# Patient Record
Sex: MALE | Race: BLACK OR AFRICAN AMERICAN | URBAN - METROPOLITAN AREA
[De-identification: names, ages, dates, MRNs, and addresses within clinical notes are randomized per-mention and may not be internally consistent; named-entity substitution may affect disease eponyms.]

---

## 2017-01-30 ENCOUNTER — TELEPHONE (OUTPATIENT)
Dept: FAMILY MEDICINE | Facility: CLINIC | Age: 37
End: 2017-01-30

## 2018-08-20 ENCOUNTER — OFFICE VISIT (OUTPATIENT)
Dept: FAMILY MEDICINE | Facility: CLINIC | Age: 38
End: 2018-08-20
Payer: COMMERCIAL

## 2018-08-20 VITALS
TEMPERATURE: 98.2 F | HEART RATE: 96 BPM | WEIGHT: 253 LBS | DIASTOLIC BLOOD PRESSURE: 84 MMHG | OXYGEN SATURATION: 97 % | BODY MASS INDEX: 30.8 KG/M2 | SYSTOLIC BLOOD PRESSURE: 124 MMHG

## 2018-08-20 DIAGNOSIS — Z71.6 TOBACCO ABUSE COUNSELING: ICD-10-CM

## 2018-08-20 DIAGNOSIS — E11.65 TYPE 2 DIABETES MELLITUS WITH HYPERGLYCEMIA, WITHOUT LONG-TERM CURRENT USE OF INSULIN (H): Primary | ICD-10-CM

## 2018-08-20 DIAGNOSIS — Z72.0 TOBACCO ABUSE: ICD-10-CM

## 2018-08-20 LAB
ANION GAP SERPL CALCULATED.3IONS-SCNC: 8 MMOL/L (ref 3–14)
BUN SERPL-MCNC: 9 MG/DL (ref 7–30)
CALCIUM SERPL-MCNC: 9.1 MG/DL (ref 8.5–10.1)
CHLORIDE SERPL-SCNC: 102 MMOL/L (ref 94–109)
CHOLEST SERPL-MCNC: 253 MG/DL
CO2 SERPL-SCNC: 26 MMOL/L (ref 20–32)
CREAT SERPL-MCNC: 0.59 MG/DL (ref 0.66–1.25)
GFR SERPL CREATININE-BSD FRML MDRD: >90 ML/MIN/1.7M2
GLUCOSE SERPL-MCNC: 261 MG/DL (ref 70–99)
HBA1C MFR BLD: 10.8 % (ref 0–5.6)
HDLC SERPL-MCNC: 44 MG/DL
LDLC SERPL CALC-MCNC: 172 MG/DL
NONHDLC SERPL-MCNC: 209 MG/DL
POTASSIUM SERPL-SCNC: 4.2 MMOL/L (ref 3.4–5.3)
SODIUM SERPL-SCNC: 136 MMOL/L (ref 133–144)
TRIGL SERPL-MCNC: 188 MG/DL
TSH SERPL DL<=0.005 MIU/L-ACNC: 1.22 MU/L (ref 0.4–4)

## 2018-08-20 ASSESSMENT — ENCOUNTER SYMPTOMS
FATIGUE: 0
COUGH: 0
CHILLS: 0
PALPITATIONS: 0
STRIDOR: 0
UNEXPECTED WEIGHT CHANGE: 0
WEAKNESS: 0
DIZZINESS: 0
NUMBNESS: 1
FEVER: 0
CHEST TIGHTNESS: 0
ACTIVITY CHANGE: 0
WHEEZING: 0
DIAPHORESIS: 0
SHORTNESS OF BREATH: 1
APPETITE CHANGE: 0

## 2018-08-20 NOTE — PATIENT INSTRUCTIONS
Here is the plan from today's visit    1. Type 2 diabetes mellitus with hyperglycemia, without long-term current use of insulin (H)  Will mychart with results   - metFORMIN (GLUCOPHAGE) 500 MG tablet; Take 2 tablets (1,000 mg) by mouth 2 times daily (with meals)  Dispense: 120 tablet; Refill: 3  - TSH with free T4 reflex  - Hemoglobin A1c  - Lipid panel reflex to direct LDL Fasting  - Basic metabolic panel    Diabetes: The Benefits of Exercise   Exercise can lower blood sugar, help control weight, and boost your mood. It can also improve blood flow, lower blood pressure, and improve heart health. Even a small amount of regular activity can have a big impact on your health      What Can Exercise Help?     Blood sugar. Regular exercise improves blood sugar control by helping your body use insulin.     Mental and emotional health. Physical activity relieves stress and helps you sleep better.     Heart health. With regular exercise, you can reduce your risk of heart disease and high blood pressure. You can also improve your cholesterol and triglyceride levels.     Weight. Exercise helps you lose fat, gain muscle, and control your weight.     Health of blood vessels and nerves. Activity helps lower blood sugar. This helps prevent damage to blood vessels and nerves that can cause problems with your brain, eyes, feet, and legs.     Finances. If you manage your blood sugar, you may spend less on medical care.  Two Types of Exercise   Two types of exercise help your body use blood sugar.     Aerobic exercise. This is a rhythmic, repeated, continued movement of large muscle groups for at least 10 minutes at a time. Examples include walking, bicycling, jogging, swimming, water aerobics, and many sports.     Resistance exercise (strength training). This type of exercise uses muscles to move weight or work against resistance. You can do it with free weights, machines, resistance tubing, or your own body weight.  When to Stop  Exercising and Call Your Doctor   Stop exercising and call your doctor right away if you notice any of the following:     Pain, pressure, tightness, or heaviness in the chest     Pain or heaviness in the neck, shoulders, back, arms, legs, or feet     Unusual shortness of breath     Dizziness or lightheadedness     Unusually rapid or slow pulse     Increased joint or muscle pain           Please call or return to clinic if your symptoms don't go away.    Your A1c values are:  Lab Results   Component Value Date    A1C 9.3 12/28/2016    A1C 9.8 12/16/2016       This chart estimates the average blood sugar based on the A1c level.  A1c (%) BG (mg/dL)   6 126   7 154   8 183   9 212   10 240   11 269   12 298   13 326     ADA Conversion tool direct link: https://[email or web address].diabetes.org/diapro/glucose_calc        Follow up plan  Please make a clinic appointment for follow up with your primary physician Nito Dutta MD in 3 months.   Please make an appointment and follow up with a Pharmacist for a Medication Therapy Management as soon as you can for Uncontrolled DM   Please bring your glucometer and your medications to your next visit.  Thank you for coming to [facility] today.  Lab Testing:  **If you had lab testing today and your results are reassuring or normal they will be mailed to you or sent through [employer] within 7 days.   **If the lab tests need quick action we will call you with the results.  The phone number we will call with results is # [phone] (home) . If this is not the best number please call our clinic and change the number.  Medication Refills:  If you need any refills please call your pharmacy and they will contact us.   If you need to  your refill at a new pharmacy, please contact the new pharmacy directly. The new pharmacy will help you get your medications transferred faster.   Scheduling:  If you have any concerns about today's visit or wish to schedule another appointment please  call our office during normal business hours [phone] (8-5:00 M-F)  If a referral was made to a [facility] and you don't get a call from central scheduling please call [phone].  If a Mammogram was ordered for you at [facility] call [phone] to schedule or change your appointment.  If you had an XRay/CT/Ultrasound/MRI ordered the number is [phone] to schedule or change your radiology appointment.   Medical Concerns:  If you have urgent medical concerns please call [phone] at any time of the day.    Rikki Franklin, DO

## 2018-08-20 NOTE — MR AVS SNAPSHOT
After Visit Summary   8/20/2018    Erika Padilla    MRN: [id number]           Patient Information     Date Of Birth          1980        Visit Information        Provider Department      8/20/2018 1:20 PM Rikki Franklin DO [facility]        Today's Diagnoses     Type 2 diabetes mellitus with hyperglycemia, without long-term current use of insulin (H)    -  1      Care Instructions    Here is the plan from today's visit    1. Type 2 diabetes mellitus with hyperglycemia, without long-term current use of insulin (H)  Will mychart with results   - metFORMIN (GLUCOPHAGE) 500 MG tablet; Take 2 tablets (1,000 mg) by mouth 2 times daily (with meals)  Dispense: 120 tablet; Refill: 3  - TSH with free T4 reflex  - Hemoglobin A1c  - Lipid panel reflex to direct LDL Fasting  - Basic metabolic panel    Diabetes: The Benefits of Exercise   Exercise can lower blood sugar, help control weight, and boost your mood. It can also improve blood flow, lower blood pressure, and improve heart health. Even a small amount of regular activity can have a big impact on your health      What Can Exercise Help?     Blood sugar. Regular exercise improves blood sugar control by helping your body use insulin.     Mental and emotional health. Physical activity relieves stress and helps you sleep better.     Heart health. With regular exercise, you can reduce your risk of heart disease and high blood pressure. You can also improve your cholesterol and triglyceride levels.     Weight. Exercise helps you lose fat, gain muscle, and control your weight.     Health of blood vessels and nerves. Activity helps lower blood sugar. This helps prevent damage to blood vessels and nerves that can cause problems with your brain, eyes, feet, and legs.     Finances. If you manage your blood sugar, you may spend less on medical care.  Two Types of Exercise   Two types of exercise help your body use blood sugar.     Aerobic  exercise. This is a rhythmic, repeated, continued movement of large muscle groups for at least 10 minutes at a time. Examples include walking, bicycling, jogging, swimming, water aerobics, and many sports.     Resistance exercise (strength training). This type of exercise uses muscles to move weight or work against resistance. You can do it with free weights, machines, resistance tubing, or your own body weight.  When to Stop Exercising and Call Your Doctor   Stop exercising and call your doctor right away if you notice any of the following:     Pain, pressure, tightness, or heaviness in the chest     Pain or heaviness in the neck, shoulders, back, arms, legs, or feet     Unusual shortness of breath     Dizziness or lightheadedness     Unusually rapid or slow pulse     Increased joint or muscle pain           Please call or return to clinic if your symptoms don't go away.    Your A1c values are:  Lab Results   Component Value Date    A1C 9.3 12/28/2016    A1C 9.8 12/16/2016       This chart estimates the average blood sugar based on the A1c level.  A1c (%) BG (mg/dL)   6 126   7 154   8 183   9 212   10 240   11 269   12 298   13 326     ADA Conversion tool direct link: https://[email or web address].diabetes.org/diapro/glucose_calc        Follow up plan  Please make a clinic appointment for follow up with your primary physician Nito Dutta MD in 3 months.   Please make an appointment and follow up with a Pharmacist for a Medication Therapy Management as soon as you can for Uncontrolled DM   Please bring your glucometer and your medications to your next visit.  Thank you for coming to [facility] today.  Lab Testing:  **If you had lab testing today and your results are reassuring or normal they will be mailed to you or sent through [employer] within 7 days.   **If the lab tests need quick action we will call you with the results.  The phone number we will call with results is # [phone] (home) . If this is not the best  number please call our clinic and change the number.  Medication Refills:  If you need any refills please call your pharmacy and they will contact us.   If you need to  your refill at a new pharmacy, please contact the new pharmacy directly. The new pharmacy will help you get your medications transferred faster.   Scheduling:  If you have any concerns about today's visit or wish to schedule another appointment please call our office during normal business hours [phone] (8-5:00 M-F)  If a referral was made to a [facility] and you don't get a call from central scheduling please call [phone].  If a Mammogram was ordered for you at [facility] call [phone] to schedule or change your appointment.  If you had an XRay/CT/Ultrasound/MRI ordered the number is [phone] to schedule or change your radiology appointment.   Medical Concerns:  If you have urgent medical concerns please call [phone] at any time of the day.    Rikki Franklin, DO            Follow-ups after your visit        Additional Services     Clinical Pharmacy-\[facility]\"" INTERNAL REFERRAL       Reason: Uncontrolled DM  Urgency of Appointment: Next Available  Is this for a one time consult or how many independent pharmacy visits should the patient have before having a follow up provider appointment? Number of Independent Pharmacy Visits=15                  Who to contact     Please call your clinic at [phone] to:    Ask questions about your health    Make or cancel appointments    Discuss your medicines    Learn about your test results    Speak to your doctor            Additional Information About Your Visit        Care EveryWhere ID     This is your Care EveryWhere ID. This could be used by other organizations to access your [facility] medical records  [id number]        Your Vitals Were     Pulse Temperature Pulse Oximetry BMI (Body Mass Index)          96 98.2  F (36.8  C) (Oral) 97% 30.8 kg/m2          Blood Pressure from Last 3 Encounters:   08/20/18 124/84   12/28/16 107/72   12/16/16 129/89    Weight from Last 3 Encounters:   08/20/18 253 lb (114.8 kg)   12/28/16 249 lb (112.9 kg)   12/16/16 250 lb 3.2 oz (113.5 kg)              We Performed the Following     Basic metabolic panel     Clinical Pharmacy-[facility] INTERNAL REFERRAL     Hemoglobin A1c     Lipid panel reflex to direct LDL Fasting     TSH with free T4 reflex          Where to get your medicines      These medications were sent to [facility] [address] [place], [place] [address] AT [facility] & [address] [place] [id number]    Hours:  24-hours Phone:  [phone]     metFORMIN 500 MG tablet          Primary Care Provider Office Phone # Fax #    Nito Dutta [phone] [address]  [facility] [id number]        Equal Access to Services     CHRISTOPHER GAVIRIA AH: Hadii aad ku hadasho Soomaali, waaxda luqadaha, qaybta kaalmada adeegyada, waxay idiin hayaan chuy escalera . So [facility] [phone].    ATENCIÓN: Si habla español, tiene a magana disposición servicios gratuitos de asistencia lingüística. [facility] [phone].    We comply with applicable federal civil rights laws and [place] laws. We do not discriminate on the basis of race, color, national origin, age, disability, sex, sexual orientation, or gender identity.            Thank you!     Thank you for choosing [facility]  for your care. Our goal is always to provide you with excellent care. Hearing back from our patients is one way we can continue to improve our services. Please take a few minutes to complete the written survey that you may receive in the mail after your visit with us. Thank you!             Your Updated Medication List - Protect others around you: Learn how to safely use, store and throw away your medicines at www.disposemymeds.org.          This list is accurate as of  8/20/18  2:16 PM.  Always use your most recent med list.                   Brand Name Dispense Instructions for use Diagnosis    blood glucose monitoring lancets     1 Box    Use to test blood sugar 1-3 times daily or as directed.    Type 2 diabetes mellitus with hyperglycemia, without long-term current use of insulin (H)       blood glucose monitoring test strip    COURTNEY CONTOUR NEXT    100 strip    Use to test blood sugar 1-3 times daily or as directed.    Type 2 diabetes mellitus with hyperglycemia, without long-term current use of insulin (H)       metFORMIN 500 MG tablet    GLUCOPHAGE    120 tablet    Take 2 tablets (1,000 mg) by mouth 2 times daily (with meals)    Type 2 diabetes mellitus with hyperglycemia, without long-term current use of insulin (H)

## 2018-08-20 NOTE — PROGRESS NOTES
"       HPI       Erika Padilla is a 38 year old male with Type 2 diabetes who presents for   Chief Complaint   Patient presents with     Breathing Problem     Pt complains of SOB x 1 wk.      Refill Request     Metformin     1. Breathing Problem. Started about a week ago. Occurs once a day. Patient feels like he can't breath and a \"pinch\" that he localizes along the caudal end of his sternum. He takes a few heavy, deep breaths to \"let it out\" and then the pain goes away. When this started a week ago it would happen after he smoked hooka, which he smoked every day after work. Because of this breathing problem, he stopped smoking hooka for the past three days but the breathing problem kept happening once a day in the afternoon. Does not occur at night time. It is not associated with movement, eating, or working out. No chest tightness, wheezing, coughing, palpitations, swelling of feet, fevers, night sweats,     2. Type 2 Diabetes    Diabetes Follow-up      Patient is checking blood sugars: not at all         -Last A1C was   Lab Results   Component Value Date    A1C 9.3 12/28/2016    A1C 9.8 12/16/2016        Diabetic concerns: None    Chest Pain or exercise related calf pain (claudication):no     Symptoms of hypoglycemia (low blood sugar): dizzy, couple of weeks ago, slurring words, ate a piece of candy and it went away, never happened before     Paresthesias (numbness or burning in feet) or sores: Yes. Burning localized to bottom of left foot that started 3 days ago     Diabetic eye exam within the last year?: No      Adherence and Exercise  Medication side effects: no  How often is a medication missed? About 1-3 times per week. Takes two 500mg metformin tablets with lunch and two tablets at night time.  Exercise: No exercise. Job is enough exercise works for [employer].    Problem, Medication and Allergy Lists were reviewed and updated if needed..    Patient is an established patient of this clinic. Last " seen at [facility] 12/28/2016.         Review of Systems:   Review of Systems   Constitutional: Negative for activity change, appetite change, chills, diaphoresis, fatigue, fever and unexpected weight change.   Eyes: Negative for visual disturbance.   Respiratory: Positive for shortness of breath. Negative for cough, chest tightness, wheezing and stridor.    Cardiovascular: Positive for chest pain. Negative for palpitations and leg swelling.   Gastrointestinal: Negative for nausea and vomiting.   Endocrine: Negative for polydipsia and polyuria.   Musculoskeletal: Negative for myalgias.   Skin: Negative for rash.   Neurological: Positive for numbness. Negative for dizziness and weakness.   Hematological: Negative for adenopathy.   Psychiatric/Behavioral: Negative for decreased concentration. The patient is not nervous/anxious.      Numbness of second left toe accompanied by burning sensation of bottom of left foot that started three days ago.         Physical Exam:     Vitals:    08/20/18 1329   BP: 124/84   Pulse: 96   Temp: 98.2  F (36.8  C)   TempSrc: Oral   SpO2: 97%   Weight: 253 lb (114.8 kg)     Body mass index is 30.8 kg/(m^2).  Vitals were reviewed and were normal     Physical Exam     Constitutional: He is oriented to person, place, and time. He appears well-developed and well-nourished. No distress.   Eyes: Conjunctivae and EOM are normal. Pupils are equal, round, and reactive to light. Right eye exhibits no discharge. Left eye exhibits no discharge. No scleral icterus.   Cardiovascular: Normal rate, regular rhythm, normal heart sounds and intact distal pulses.  Exam reveals no gallop and no friction rub.  No murmur heard.  Pulmonary/Chest: Effort normal and breath sounds normal. No respiratory distress. He has no wheezes. He has no rales. He exhibits no tenderness to palpation over sternum.  Abdominal: Soft. He exhibits no distension. There is no tenderness. There is no rebound and no guarding.    Musculoskeletal: He exhibits no lower extremity edema. No sores on either feet with intact sensation and palpable pedal pulses.   Neurological: He is alert and oriented to person, place, and time.   Skin: He is not diaphoretic.   Psychiatric: He has a normal mood and affect. His behavior is normal.         Results:      Results from this visit  Results for orders placed or performed in visit on 08/20/18   TSH with free T4 reflex   Result Value Ref Range    TSH 1.22 0.40 - 4.00 mU/L   Hemoglobin A1c   Result Value Ref Range    Hemoglobin A1C 10.8 (H) 0 - 5.6 %   Lipid panel reflex to direct LDL Fasting   Result Value Ref Range    Cholesterol 253 (H) <200 mg/dL    Triglycerides 188 (H) <150 mg/dL    HDL Cholesterol 44 >39 mg/dL    LDL Cholesterol Calculated 172 (H) <100 mg/dL    Non HDL Cholesterol 209 (H) <130 mg/dL   Basic metabolic panel   Result Value Ref Range    Sodium 136 133 - 144 mmol/L    Potassium 4.2 3.4 - 5.3 mmol/L    Chloride 102 94 - 109 mmol/L    Carbon Dioxide 26 20 - 32 mmol/L    Anion Gap 8 3 - 14 mmol/L    Glucose 261 (H) 70 - 99 mg/dL    Urea Nitrogen 9 7 - 30 mg/dL    Creatinine 0.59 (L) 0.66 - 1.25 mg/dL    GFR Estimate >90 >60 mL/min/1.7m2    GFR Estimate If Black >90 >60 mL/min/1.7m2    Calcium 9.1 8.5 - 10.1 mg/dL       Assessment and Plan      Erika Padilla is a 38 year old male with Type 2 diabetes who presents for 1 week history of daily episode of difficulty breathing with some chest pain that is associated with smoking hooka and resolves with a few deep breaths. Patient stopped smoking hooka 3 days ago but daily breathing episode persisted.     Type 2 diabetes mellitus with hyperglycemia, without long-term current use of insulin (H)  Patient was last seen 12/2016 for diabetes care at which time his [id number] was 9.3. He has been taking his metformin but missing doses 2-3 times per week.   - Refilled metformin prescription for 3 months  - Ordered routine labs for Type 2  diabetes (Hemoglobin A1c, Lipid panel, BMP and TSH with free T4).   - Will send lab results to patient when labs result .   - Will initiate statin if LDL is elevated   - Pneumovax  - Follow-up with PCP in a few weeks. Follow up with pharmacist for Medication Therapy Management as soon as possible.  -     metFORMIN (GLUCOPHAGE) 500 MG tablet; Take 2 tablets (1,000 mg) by mouth 2 times daily (with meals)  -     TSH with free T4 reflex  -     Hemoglobin A1c  -     Lipid panel reflex to direct LDL Fasting  -     Basic metabolic panel  -     Clinical Pharmacy-hospitals INTERNAL REFERRAL  -     ADMIN VACCINE, INITIAL  -     Pneumococcal vaccine 23 valent PPSV23  (Pneumovax) [79950]    Tobacco abuse  Tobacco abuse counseling  -     SMOKING CESSATION COUNSELING 3-10 MIN (Tobacco Nicotine) [67970]  Breathing Problem. Presentation of symptoms along with vitals and physical examination are not concerning for acute cardiovascular or respiratory disease. Discussed continuing hooka smoking cessation with patient and monitoring of symptoms with follow-up if symptoms get worse. Also potential for anxiety related component.      Medications Discontinued During This Encounter   Medication Reason     metFORMIN (GLUCOPHAGE) 500 MG tablet Reorder     Multiple Vitamins-Minerals (QC MENS DAILY MULTIVITAMIN) TABS Stopped by Patient     Cholecalciferol (VITAMIN D) 2000 UNITS tablet Stopped by Patient       Options for treatment and follow-up care were reviewed with the patient. Erika Padilla  engaged in the decision making process and verbalized understanding of the options discussed and agreed with the final plan.    Abhi Ford, MS3    Resident/Fellow Attestation   I, Rikki Franklin, was present with the medical student who participated in the service and in the documentation of the note.  I have verified the history and personally performed the physical exam and medical decision making.  I agree with the assessment and plan of  care as documented in the note.      Rikki Franklin DO, MA  Family Medicine PGY-2  [facility], \[facility] [place] [facility]\"" Family Medicine   Pager: [phone]

## 2018-08-20 NOTE — PROGRESS NOTES
Preceptor Attestation:   Patient seen, evaluated and discussed with the resident. I have verified the content of the note, which accurately reflects my assessment of the patient and the plan of care.   Supervising Physician:  Shivani Hunter MD

## 2018-08-21 ENCOUNTER — TELEPHONE (OUTPATIENT)
Dept: FAMILY MEDICINE | Facility: CLINIC | Age: 38
End: 2018-08-21

## 2018-08-21 DIAGNOSIS — E78.5 HYPERLIPIDEMIA LDL GOAL <100: ICD-10-CM

## 2018-08-21 DIAGNOSIS — E11.65 TYPE 2 DIABETES MELLITUS WITH HYPERGLYCEMIA, WITHOUT LONG-TERM CURRENT USE OF INSULIN (H): Primary | ICD-10-CM

## 2018-08-21 RX ORDER — ATORVASTATIN CALCIUM 40 MG/1
40 TABLET, FILM COATED ORAL DAILY
Qty: 30 TABLET | Refills: 1 | Status: SHIPPED | OUTPATIENT
Start: 2018-08-21 | End: 2019-08-19

## 2018-08-21 ASSESSMENT — ENCOUNTER SYMPTOMS
POLYDIPSIA: 0
NERVOUS/ANXIOUS: 0
VOMITING: 0
DECREASED CONCENTRATION: 0
MYALGIAS: 0
NAUSEA: 0
ADENOPATHY: 0

## 2019-02-11 DIAGNOSIS — E11.65 TYPE 2 DIABETES MELLITUS WITH HYPERGLYCEMIA, WITHOUT LONG-TERM CURRENT USE OF INSULIN (H): ICD-10-CM

## 2019-02-12 NOTE — TELEPHONE ENCOUNTER

## 2019-08-01 ENCOUNTER — TELEPHONE (OUTPATIENT)
Dept: FAMILY MEDICINE | Facility: CLINIC | Age: 39
End: 2019-08-01

## 2019-08-19 ENCOUNTER — OFFICE VISIT (OUTPATIENT)
Dept: FAMILY MEDICINE | Facility: CLINIC | Age: 39
End: 2019-08-19
Payer: COMMERCIAL

## 2019-08-19 VITALS
WEIGHT: 246.8 LBS | OXYGEN SATURATION: 99 % | RESPIRATION RATE: 14 BRPM | SYSTOLIC BLOOD PRESSURE: 121 MMHG | DIASTOLIC BLOOD PRESSURE: 88 MMHG | HEIGHT: 74 IN | HEART RATE: 71 BPM | TEMPERATURE: 98.2 F | BODY MASS INDEX: 31.67 KG/M2

## 2019-08-19 DIAGNOSIS — E78.5 HYPERLIPIDEMIA LDL GOAL <100: ICD-10-CM

## 2019-08-19 DIAGNOSIS — E11.65 TYPE 2 DIABETES MELLITUS WITH HYPERGLYCEMIA, WITHOUT LONG-TERM CURRENT USE OF INSULIN (H): Primary | ICD-10-CM

## 2019-08-19 DIAGNOSIS — Z00.01 ENCOUNTER FOR ROUTINE ADULT MEDICAL EXAM WITH ABNORMAL FINDINGS: ICD-10-CM

## 2019-08-19 LAB
BUN SERPL-MCNC: 5.6 MG/DL (ref 7–21)
CALCIUM SERPL-MCNC: 9.4 MG/DL (ref 8.5–10.1)
CHLORIDE SERPLBLD-SCNC: 98.7 MMOL/L (ref 98–110)
CHOLEST SERPL-MCNC: 235.4 MG/DL (ref 0–200)
CHOLEST/HDLC SERPL: 5 {RATIO} (ref 0–5)
CO2 SERPL-SCNC: 25.1 MMOL/L (ref 20–32)
CREAT SERPL-MCNC: 0.6 MG/DL (ref 0.7–1.3)
GFR SERPL CREATININE-BSD FRML MDRD: >90 ML/MIN/1.7 M2
GLUCOSE SERPL-MCNC: 344.2 MG'DL (ref 70–99)
HBA1C MFR BLD: 11.6 % (ref 4.1–5.7)
HDLC SERPL-MCNC: 47.4 MG/DL
LDLC SERPL CALC-MCNC: 149 MG/DL (ref 0–129)
POTASSIUM SERPL-SCNC: 3.8 MMOL/DL (ref 3.3–4.5)
SODIUM SERPL-SCNC: 134.1 MMOL/L (ref 132.6–141.4)
TRIGL SERPL-MCNC: 195.1 MG/DL (ref 0–150)
VLDL CHOLESTEROL: 39 MG/DL (ref 7–32)

## 2019-08-19 RX ORDER — BISMUTH SUBSALICYLATE 262MG/15ML
SUSPENSION, ORAL (FINAL DOSE FORM) ORAL
Qty: 100 EACH | Refills: 0 | Status: SHIPPED | OUTPATIENT
Start: 2019-08-19 | End: 2019-08-28

## 2019-08-19 RX ORDER — ATORVASTATIN CALCIUM 40 MG/1
40 TABLET, FILM COATED ORAL DAILY
Qty: 30 TABLET | Refills: 1 | Status: SHIPPED | OUTPATIENT
Start: 2019-08-19 | End: 2019-08-28

## 2019-08-19 RX ORDER — BLOOD-GLUCOSE METER
EACH MISCELLANEOUS
Qty: 1 KIT | Refills: 0 | Status: SHIPPED | OUTPATIENT
Start: 2019-08-19 | End: 2022-02-18

## 2019-08-19 ASSESSMENT — MIFFLIN-ST. JEOR: SCORE: 2104.23

## 2019-08-19 NOTE — PROGRESS NOTES
HPI       Erika Padilla is a 39 year old  who presents for   Chief Complaint   Patient presents with     Physical     Diabetic education information         Diabetes Follow-up  <7.0    Patient is checking blood sugars: not at all         -Last A1C was   Lab Results   Component Value Date    A1C 11.6 08/19/2019    A1C 10.8 08/20/2018    A1C 9.3 12/28/2016    A1C 9.8 12/16/2016        Diabetic concerns: None    Chest Pain or exercise related calf pain (claudication):no     Symptoms of hypoglycemia (low blood sugar): none     Paresthesias (numbness or burning in feet) or sores: No     Diabetic eye exam within the last year?: No    Hyperlipidemia Follow-Up      Recommended Level of Therapy:High Intensity Statin (atorvastatin 40*-80 mg or rosuvastatin 20-40mg)    Rate your low fat/cholesterol diet?: good    Taking statin?  Stopped due to unsure    Other lipid medications/supplements?:  none  Lab Results   Component Value Date    CHOL 253 08/20/2018     Lab Results   Component Value Date    HDL 44 08/20/2018     Lab Results   Component Value Date     08/20/2018     Lab Results   Component Value Date    TRIG 188 08/20/2018     Lab Results   Component Value Date    CHOLHDLRATIO 5.7 12/16/2016       Last Basic Metabolic Panel:  Lab Results   Component Value Date     08/20/2018      Lab Results   Component Value Date    POTASSIUM 4.2 08/20/2018     Lab Results   Component Value Date    CHLORIDE 102 08/20/2018     Lab Results   Component Value Date    KRISH 9.1 08/20/2018     Lab Results   Component Value Date    CO2 26 08/20/2018     Lab Results   Component Value Date    BUN 9 08/20/2018     Lab Results   Component Value Date    CR 0.59 08/20/2018     Lab Results   Component Value Date     08/20/2018         Adherence and Exercise  Medication side effects: yes: some loose stool with metformin  How often is a medication missed? More than 3 times per week  Exercise:No exercise None       "  +++++++      Problem, Medication and Allergy Lists were reviewed and updated if needed..    Patient is an established patient of this clinic..         Review of Systems:   Review of Systems  ROS: 7 point ROS neg other than the symptoms noted above in the HPI.       Physical Exam:     Vitals:    08/19/19 1325   BP: 121/88   Pulse: 71   Resp: 14   Temp: 98.2  F (36.8  C)   TempSrc: Oral   SpO2: 99%   Weight: 111.9 kg (246 lb 12.8 oz)   Height: 1.88 m (6' 2\")     Body mass index is 31.69 kg/m .  Vitals were reviewed and were normal     Physical Exam  General- No acute distress, well-appearing  Skin- warm, no obvious skin lesions  Neuro- AOX3, CN 2-12 grossly intact  Cardio- RRR, no murmurs  Pulmonary- CTA in all fields, no wheezes or rhales  Psych- appropriate mood and affect    Results:      Results from this visit  Results for orders placed or performed in visit on 08/19/19   Hemoglobin A1c ([facility])   Result Value Ref Range    Hemoglobin A1C 11.6 (H) 4.1 - 5.7 %       Assessment and Plan        Erika was seen today for physical.    Diagnoses and all orders for this visit:    Patient here for follow-up.  Patient initially scheduled for physical to due to time constraints only had time to discuss diabetes management.  Patient has not been checking his blood sugars or taking his metformin.  Patient never was able to fill his atorvastatin.  Patient has not been working on dietary changes and drinks a large amount of iced tea every day.  Discussed with patient various treatment options including starting insulin which I recommend given his hemoglobin A1c above 10.  Patient was agreeable to starting insulin and we had him meet with 1 of our RNs in clinic today to discuss how to do that.  Patient will check his blood sugar twice a day and recommend he follow-up in 2 weeks to further titrate up his insulin.  Patient would also like to restart metformin to see if continued use will result in improvement in his " diarrhea.  Patient did report that he is moving to [place] and I recommend that he get established with a provider there as soon as possible as his insulin is given need careful titration.  Patient would also benefit from a diabetic foot exam and eye exam which we did not have time to discuss today.        Type 2 diabetes mellitus with hyperglycemia, without long-term current use of insulin (H)  -     Hemoglobin A1c ([facility])  -     Basic Metabolic Panel ([facility])  -     Lipid Cascade ([facility])  -     Cancel: Urinalysis, Micro If (UA) ([facility])  -     blood glucose (COURTNEY CONTOUR NEXT) test strip; Use to test blood sugar 1-3 times daily or as directed.  -     metFORMIN (GLUCOPHAGE) 500 MG tablet; Take 2 tablets (1,000 mg) by mouth 2 times daily (with meals) - NEEDS APPOINTMENT  -     blood glucose monitoring (ULTRA THIN 30G) lancets; Use to test blood sugar 1-3 times daily or as directed.  -     insulin glargine (LANTUS PEN) 100 UNIT/ML pen; Inject 10 Units Subcutaneous At Bedtime  -     blood glucose monitoring (ACCU-CHEK DARRIN PLUS) meter device kit; Use to test blood sugar 2 times daily or as directed.    Encounter for routine adult medical exam with abnormal findings  -     insulin glargine (LANTUS PEN) 100 UNIT/ML pen; Inject 10 Units Subcutaneous At Bedtime  -     blood glucose monitoring (ACCU-CHEK DARRIN PLUS) meter device kit; Use to test blood sugar 2 times daily or as directed.    Hyperlipidemia LDL goal <100  -     insulin glargine (LANTUS PEN) 100 UNIT/ML pen; Inject 10 Units Subcutaneous At Bedtime  -     blood glucose monitoring (ACCU-CHEK DARRIN PLUS) meter device kit; Use to test blood sugar 2 times daily or as directed.  -     atorvastatin (LIPITOR) 40 MG tablet; Take 1 tablet (40 mg) by mouth daily           Medications Discontinued During This Encounter   Medication Reason     blood glucose monitoring (COURTNEY CONTOUR NEXT) test strip Reorder     metFORMIN (GLUCOPHAGE) 500 MG tablet Reorder      blood glucose monitoring (ULTRA THIN 30G) lancets Reorder     atorvastatin (LIPITOR) 40 MG tablet Reorder       Options for treatment and follow-up care were reviewed with the patient. Erika Padilla  engaged in the decision making process and verbalized understanding of the options discussed and agreed with the final plan.    Gato Bear, DO

## 2019-08-19 NOTE — NURSING NOTE
AVS and Discharge instructions reviewed with the patient, including how to use blood glucose monitoring machine, proper insulin dosage and self-administering. Pt was able to demonstrate the instruction in clinic, encouraged,  to call back the clinic for any question or clarification, Pt verbalized understanding and agreed with the plan.    Francisco Levine RN

## 2019-08-19 NOTE — PATIENT INSTRUCTIONS
Preventive Health Recommendations  Male Ages 26 - 39    Yearly exam:             See your health care provider every year in order to  o   Review health changes.   o   Discuss preventive care.    o   Review your medicines if your doctor has prescribed any.    You should be tested each year for STDs (sexually transmitted diseases), if you re at risk.     After age 35, talk to your provider about cholesterol testing. If you are at risk for heart disease, have your cholesterol tested at least every 5 years.     If you are at risk for diabetes, you should have a diabetes test (fasting glucose).  Shots: Get a flu shot each year. Get a tetanus shot every 10 years.     Nutrition:    Eat at least 5 servings of fruits and vegetables daily.     Eat whole-grain bread, whole-wheat pasta and brown rice instead of white grains and rice.     Get adequate Calcium and Vitamin D.     Lifestyle    Exercise for at least 150 minutes a week (30 minutes a day, 5 days a week). This will help you control your weight and prevent disease.     Limit alcohol to one drink per day.     No smoking.     Wear sunscreen to prevent skin cancer.     See your dentist every six months for an exam and cleaning.   Diabetes Information    MY SELF MANAGEMENT GOAL(S):    Fasting ( morning blood sugar) should be less than 130    After meals should be less than 200      GENERAL TARGETS FOR YOUR DIABETES CARE:    A1c   Mine is:   Lab Results   Component Value Date    A1C 11.6 08/19/2019    Goal: is at least under 7.0 (higher for some people)  Check it: Every 3 to 6 months  Why:  Shows how well your blood glucose was controlled over the past 3 months    Blood pressure   Mine was 121/88 today  Goal: Under 140/90  Check it:  At every clinic check up for diabetes  Why:  May prevent stroke, heart disease and eye and kidney diseases    Cholesterol   Mine is:   Lab Results   Component Value Date     08/20/2018    Goal:  Goal LDL (bad cholesterol) is at least  under 100 (lower for many people), all people with diabetes should be taking a statin medicine  Check it:  Every year  Why:  Helps prevent heart attacks and stroke    Aspirin  Goal:  If you are age 50 or older, ask your doctor if you should take aspirin  Take it: Every day, or as prescribed  Why: Lowers the risk of heart attack and stroke    Smoking and tobacco  Goal: No tobacco use  Why: Tobacco is a major risk for heart disease    Kidney test (urine microalbumin)  Goal:  Under 30  Do it:  Every year  Why:  Finds kidney problems before they get worse  Foot exam   Goal:  No cuts or sores, normal sensation  Do it: Remove shoes and socks at every visit; have a monofilament test every year  Why: Finds foot problems before they get worse  Eye exam   Goal:  No changes in your eyes  Do it: Every year  Why:  Finds eye problems before they get worse  Exercise  Goal:  150 minutes of aerobic exercises and 2 to 3 sessions of strength training  Do it: Every week  Why:  Helps manage diabetes and improve health  Diabetes education  Goal: Learn how to manage your diabetes  Do it: At least once a year--ask your doctor for a referral  Why: The more you know, the better you can manage your diabetes  Your goals may be different from what you see here. Your care team will tell you what your goals should be.   For informational purposes only. Not to replace the advice of your health care provider.     GENERAL ONLINE LINKS:    https://www.acponline.org/system/files/documents/patients_families/products/brochures/protected/living-with-diabetes-english.pdf    A comprehensive PDF that covers a variety of topics including exercise, diet, checking blood sugar, foot checks and information about medicines and insulin.     https://ethnomed.org/patient-education/diabetes    A great source site with information on a variety of different topics in many different languages including [place]an and  [place].     https://www.reg.org/ape/courselisting/onlinecourselisting.asp     EATING HEALTHY WITH DIABETES:  Plate Method

## 2019-08-21 ENCOUNTER — CARE COORDINATION (OUTPATIENT)
Dept: FAMILY MEDICINE | Facility: CLINIC | Age: 39
End: 2019-08-21

## 2019-08-21 NOTE — PROGRESS NOTES
Erika Padilla is a 39 year old male who has diabetes, and his sister Stefan called the  staff regarding his condition. Stefan was asking about the different programs that were provided by the clinic that can help with the pt's condition. CHW called Stefan back to discuss the CDSMP classes provided to [place] patients with chronic conditions, the pt's sister was unable to be reached. CHW left a detailed VM requesting a call back to [facility].     Erica Laytonthais, August 21, 2019 at 2:15 PM

## 2019-08-23 ENCOUNTER — CARE COORDINATION (OUTPATIENT)
Dept: FAMILY MEDICINE | Facility: CLINIC | Age: 39
End: 2019-08-23

## 2019-08-23 ENCOUNTER — TELEPHONE (OUTPATIENT)
Dept: FAMILY MEDICINE | Facility: CLINIC | Age: 39
End: 2019-08-23

## 2019-08-23 ENCOUNTER — PATIENT OUTREACH (OUTPATIENT)
Dept: FAMILY MEDICINE | Facility: CLINIC | Age: 39
End: 2019-08-23

## 2019-08-23 NOTE — TELEPHONE ENCOUNTER
Patients sister called to return Erica Padilla's phone call from 08/21/2019 and would like a return phone call regarding the voice message.     Assisted and making an appointment for Patient on 08/28/2019 with Dr Bear.    Kaley Fuentes [facility]

## 2019-08-23 NOTE — PROGRESS NOTES
Erika Padilla is a 39 year old male who received a call back from the CHW as requested. CHW spoke with the pt's sister Stefan regarding her concern for the pt's condition, and her thought that the pt isn't able to manage his diabetes. She stated that he is a  and would like if the clinic can provide him with some patient education. CHW discussed the CDSMP class with the pt, but was told that he needed something sooner while his sister encourages the pt to stay back from work for a while. She think he doesn't take his condition seriously. CHW discussed the possibility of a home visit for the pt and providing him with some health promotion materials and patient education on how to better manage his condition. CHW asked if the pt would be open to a home visit and she said he would be. Pt's sister was told to give CHW time to coordinate that visit with other clinic staff, and to expect a call back.    Erica Padilla, August 23, 2019 at 9:45 AM

## 2019-08-26 ENCOUNTER — TELEPHONE (OUTPATIENT)
Dept: FAMILY MEDICINE | Facility: CLINIC | Age: 39
End: 2019-08-26

## 2019-08-26 ENCOUNTER — CARE COORDINATION (OUTPATIENT)
Dept: FAMILY MEDICINE | Facility: CLINIC | Age: 39
End: 2019-08-26

## 2019-08-26 NOTE — PROGRESS NOTES
Erika Padilla is a 39 year old male who's sister Stefan reached out to the CHW to follow-up with the CHW regarding the possibility of a home visit for the pt. The pt's sister was told the pt's PCP wouldn't be able to attend a home visit for the pt soon, and we would look further into having another provider attend a possible home visit. The pt's sister was concerned about the pt not being able to get some pt education before he goes back to his job as a , and was told by the CHW that the clinic would provide some pt education for the pt during his visit. CHW also agreed to attend the pt's visit to provide some pt education and give the pt some tools to use to help better manager his DM.    Erica Padilla, August 26, 2019 at 2:37 PM

## 2019-08-26 NOTE — TELEPHONE ENCOUNTER
Diabetes Education Scheduling Outreach #1:    Call to patient to schedule. Patient declined to schedule. Initially, [facility] Offered to provide scheduling number, but then he didn't want to call to check on availability. Then he decided he wanted to go to [facility]. Then he changed his mind and decided that he does not need this appointment because he already knows what type of food to avoid, sugar intake, etc.    Megan Kam  [facility]  Diabetes and Nutrition Scheduling

## 2019-08-27 ENCOUNTER — PATIENT OUTREACH (OUTPATIENT)
Dept: FAMILY MEDICINE | Facility: CLINIC | Age: 39
End: 2019-08-27

## 2019-08-27 ENCOUNTER — CARE COORDINATION (OUTPATIENT)
Dept: FAMILY MEDICINE | Facility: CLINIC | Age: 39
End: 2019-08-27

## 2019-08-27 NOTE — PROGRESS NOTES
RN contacted patient who just started using a meter and insulin last week. He has been checking his sugars morning before eating and night time before bed. In the morning they have been anywhere from . At night they are roughly 160-170. He is using his insulin and was wondering if it is concerning to have a small amount of blood where the needle was. RN reassured him that if it was a small amount, then there is nothing to worry about. If it became a large amount where it would not stop bleeding, that would be concerning and he should let us know.   He inquired about refills, and RN encouraged him to wait until he is seen tomorrow as the medication dose may change since it seems his sugars are still fairly high. He was okay with this and was grateful for the call.   Routing to PCP and provider who is seeing patient tomorrow as an FYI of conversation and glucose readings.  Navya Baig RN

## 2019-08-27 NOTE — PROGRESS NOTES
Erika Padilla is a 39 year old male who was reached out to by the CHW regarding his interest in DM education. CHW spoke with the pt and asked if he was still interested in getting DM education during his visit to the clinic tomorrow morning and the pt agreed and stated that he was looking forward to it. The pt also stated he got a call about DM education from the clinic that told him the next visit they can do a DM education for the pt was in 3 weeks. The pt said his job as a  wouldn't allow that, and only agreed to get DM education during his visit.      Erica Padilla, August 27, 2019 at 9:55 AM

## 2019-08-28 ENCOUNTER — CARE COORDINATION (OUTPATIENT)
Dept: FAMILY MEDICINE | Facility: CLINIC | Age: 39
End: 2019-08-28

## 2019-08-28 ENCOUNTER — OFFICE VISIT (OUTPATIENT)
Dept: FAMILY MEDICINE | Facility: CLINIC | Age: 39
End: 2019-08-28
Payer: COMMERCIAL

## 2019-08-28 VITALS
TEMPERATURE: 98.3 F | DIASTOLIC BLOOD PRESSURE: 82 MMHG | SYSTOLIC BLOOD PRESSURE: 116 MMHG | BODY MASS INDEX: 31.65 KG/M2 | HEIGHT: 74 IN | HEART RATE: 74 BPM | OXYGEN SATURATION: 97 % | WEIGHT: 246.6 LBS

## 2019-08-28 DIAGNOSIS — E11.65 TYPE 2 DIABETES MELLITUS WITH HYPERGLYCEMIA, WITHOUT LONG-TERM CURRENT USE OF INSULIN (H): ICD-10-CM

## 2019-08-28 DIAGNOSIS — E78.5 HYPERLIPIDEMIA LDL GOAL <100: ICD-10-CM

## 2019-08-28 LAB — GLUCOSE SERPL-MCNC: 161 MG'DL (ref 70–99)

## 2019-08-28 RX ORDER — BISMUTH SUBSALICYLATE 262MG/15ML
SUSPENSION, ORAL (FINAL DOSE FORM) ORAL
Qty: 100 EACH | Refills: 0 | Status: SHIPPED | OUTPATIENT
Start: 2019-08-28 | End: 2019-10-21

## 2019-08-28 RX ORDER — BLOOD-GLUCOSE METER
EACH MISCELLANEOUS
Qty: 1 KIT | Refills: 0 | Status: CANCELLED | OUTPATIENT
Start: 2019-08-28

## 2019-08-28 RX ORDER — ATORVASTATIN CALCIUM 40 MG/1
40 TABLET, FILM COATED ORAL DAILY
Qty: 30 TABLET | Refills: 1 | Status: SHIPPED | OUTPATIENT
Start: 2019-08-28 | End: 2020-08-18

## 2019-08-28 ASSESSMENT — MIFFLIN-ST. JEOR: SCORE: 2103.32

## 2019-08-28 NOTE — PROGRESS NOTES
"       HPI       Erika Padilla is a 39 year old  who presents for   Chief Complaint   Patient presents with     Diabetes     f/u       Diabetes Follow-up    Patient is checking blood sugars: twice daily.    Blood sugar testing frequency justification: On insulin, frequency appropriate   Results are as follows:         am - 140-160s         -Last A1C was   Lab Results   Component Value Date    A1C 11.6 08/19/2019    A1C 10.8 08/20/2018    A1C 9.3 12/28/2016    A1C 9.8 12/16/2016        Diabetic concerns: None    Chest Pain or exercise related calf pain (claudication):no     Symptoms of hypoglycemia (low blood sugar): none     Paresthesias (numbness or burning in feet) or sores: No     Diabetic eye exam within the last year?: No      Adherence and Exercise  Medication side effects: no  How often is a medication missed? Less than 1 time per week  Exercise:No exercise None        +++++++      Problem, Medication and Allergy Lists were reviewed and updated if needed..    Patient is an established patient of this clinic..         Review of Systems:   Review of Systems  ROS: 7 point ROS neg other than the symptoms noted above in the HPI.       Physical Exam:     Vitals:    08/28/19 0824   BP: 116/82   Pulse: 74   Temp: 98.3  F (36.8  C)   TempSrc: Oral   SpO2: 97%   Weight: 111.9 kg (246 lb 9.6 oz)   Height: 1.88 m (6' 2\")     Body mass index is 31.66 kg/m .  Vitals were reviewed and were normal     Physical Exam  General- No acute distress, well-appearing  Skin- warm, no obvious skin lesions  Neuro- AOX3, CN 2-12 grossly intact  Cardio- RRR, no murmurs  Pulmonary- CTA in all fields, no wheezes or rhales  Psych- appropriate mood and affect    Results:      Results from this visit  Results for orders placed or performed in visit on 08/28/19   Glucose ([facility])   Result Value Ref Range    Glucose 161.0 (H) 70.0 - 99.0 mg'dL       Assessment and Plan        Erika was seen today for diabetes.    Diagnoses and all " orders for this visit:      --Plan is to increase lantus to 15u every day  --continue metformin  --referral to DM education  --follow-up in 2 weeks, likely needs increase in lantus  --recheck A1c in Early november        Hyperlipidemia LDL goal <100  -     atorvastatin (LIPITOR) 40 MG tablet; Take 1 tablet (40 mg) by mouth daily  -     insulin glargine (LANTUS PEN) 100 UNIT/ML pen; Inject 15 Units Subcutaneous At Bedtime    Type 2 diabetes mellitus with hyperglycemia, without long-term current use of insulin (H)  -     blood glucose (COURTNEY CONTOUR NEXT) test strip; Use to test blood sugar 1-3 times daily or as directed.  -     blood glucose monitoring (ULTRA THIN 30G) lancets; Use to test blood sugar 1-3 times daily or as directed.  -     insulin glargine (LANTUS PEN) 100 UNIT/ML pen; Inject 15 Units Subcutaneous At Bedtime  -     insulin pen needle (31G X 8 MM) 31G X 8 MM miscellaneous; Use Pen Needles to Inject 10 Units of Insulin (lantus) Subcutaneous At Bedtime - As directed  -     metFORMIN (GLUCOPHAGE) 500 MG tablet; Take 2 tablets (1,000 mg) by mouth 2 times daily (with meals) - NEEDS APPOINTMENT  -     DIABETES EDUCATOR REFERRAL  -     Glucose ([facility])    Encounter for routine adult medical exam with abnormal findings  -     insulin glargine (LANTUS PEN) 100 UNIT/ML pen; Inject 15 Units Subcutaneous At Bedtime           There are no discontinued medications.    Options for treatment and follow-up care were reviewed with the patient. Erika Padilla  engaged in the decision making process and verbalized understanding of the options discussed and agreed with the final plan.    Gato Bear, DO

## 2019-08-28 NOTE — PROGRESS NOTES
Preceptor Attestation:   Patient seen, evaluated and discussed with the resident. I have verified the content of the note, which accurately reflects my assessment of the patient and the plan of care.   Supervising Physician:  Amaris Glass MD MD

## 2019-08-28 NOTE — PROGRESS NOTES
Erika Padilla is a 39 year old who was reached out to by the CHW to provide the pt with some brief DM education. The pt expressed that he was compliant with taking his medication as prescribed, and CHW explained that he would need to do more than just take his medication. CHW encouraged pt to continue taking his medication, and to incorporate exercise into his daily routine and to be cautious with his diet. Pt was provided with some health promotion materials tailored to people with diabetes. He was given a DVD that covers traditional [place] dishes and how they can be made more suitable for someone with diabetes. The pt expressed nervousness about being newly diagnosed with diabetes, and stated that he felt it changed his whole life. CHW reassured the pt that his condition could be managed, and gave the pt tips on diabetes self-management. The pt stated that he wasn't sure he would attend the referral from his provider to attend DM education at [facility], CHW reassured the pt that he would benefit from the opportunity and encouraged pt to seek the referral. CHW will call pt in two weeks to follow-up and ensure he was able to schedule an appointment for further DM education.    Erica Laytonthais, August 28, 2019 at 9:30 PM

## 2019-09-05 ENCOUNTER — TELEPHONE (OUTPATIENT)
Dept: FAMILY MEDICINE | Facility: CLINIC | Age: 39
End: 2019-09-05

## 2019-09-05 NOTE — TELEPHONE ENCOUNTER
RN attempted reaching him after missing his returned call. He was unavailable, left message for him to return call to clinic.  Navya Baig RN

## 2019-09-05 NOTE — TELEPHONE ENCOUNTER
Patient returning call. Scheduled DM follow up appointment for 9/30/19 with Dr Dutta (this is both Dr Dutta's first opening, as well as the soonest patient can come in due to work schedule). Attempted transfer to RN; not available. Patient ended call while on hold before able to try RN again. RN - please return patient's call.

## 2019-09-05 NOTE — TELEPHONE ENCOUNTER
RN attempted to reach patient to discuss diabetic regimen, concerns, and goals. Left voicemail with name and callback number.    When patient calls back, please relay that per the last office note, the doctor wants to see them in the office for their diabetes in 1 week, and assist them to schedule. Then please transfer them to Navya Ackerman RN. Thank you.  Navya Baig RN

## 2019-09-24 ENCOUNTER — CARE COORDINATION (OUTPATIENT)
Dept: FAMILY MEDICINE | Facility: CLINIC | Age: 39
End: 2019-09-24

## 2019-09-24 NOTE — PROGRESS NOTES
Erika Padilla was reached out to by the CHW to schedule him for the CSDMP beginning on 10/2/2019 from 2:30-5:00 PM. The pt was referred by his PCP but wasn't able to be reached. Pt was left a detailed VM with a call back request to the CHW's direct line.    ADELITA Kyle , September 24, 2019 at 11:02 AM

## 2019-10-08 ENCOUNTER — TELEPHONE (OUTPATIENT)
Dept: FAMILY MEDICINE | Facility: CLINIC | Age: 39
End: 2019-10-08

## 2019-10-08 NOTE — TELEPHONE ENCOUNTER
RN attempted to reach patient to discuss diabetic regimen, concerns, and goals. Left voicemail with name and callback number requesting to please call back and ask to be transferred to RN.    When patient calls back, please relay that per the last office note, the doctor wants to see them in the office for their diabetes at their earliest convenience, and assist them to schedule. Then please transfer them to Navya Ackerman RN. Thank you.  Navya Baig RN

## 2019-10-21 DIAGNOSIS — E11.65 TYPE 2 DIABETES MELLITUS WITH HYPERGLYCEMIA, WITHOUT LONG-TERM CURRENT USE OF INSULIN (H): ICD-10-CM

## 2019-10-21 RX ORDER — BISMUTH SUBSALICYLATE 262MG/15ML
SUSPENSION, ORAL (FINAL DOSE FORM) ORAL
Qty: 100 EACH | Refills: 0 | Status: SHIPPED | OUTPATIENT
Start: 2019-10-21 | End: 2023-08-21

## 2019-10-21 NOTE — TELEPHONE ENCOUNTER

## 2019-10-25 ENCOUNTER — TELEPHONE (OUTPATIENT)
Dept: FAMILY MEDICINE | Facility: CLINIC | Age: 39
End: 2019-10-25

## 2019-10-25 NOTE — TELEPHONE ENCOUNTER
Prior Authorization Retail Medication Request    Medication/Dose: blood glucose monitoring (ULTRA THIN 30G) lancets  ICD code (if different than what is on RX):  Type 2 diabetes mellitus with hyperglycemia, without long-term current use of insulin (H) [E11.65]   Previously Tried and Failed:  See chart  Rationale:  See chart    Insurance Name:  [facility]  Insurance ID:  [id number]       Pharmacy Information (if different than what is on RX)  Name:  [facility]  Phone:  [phone]

## 2019-10-29 NOTE — TELEPHONE ENCOUNTER
Prior Authorization Not Needed per Insurance    Medication: blood glucose monitoring (ULTRA THIN 30G) lancets - PA Not Needed  Insurance Company:    Expected CoPay:      Pharmacy Filling the Rx: [id number] - [facility] [address] AT [address]  Pharmacy Notified: Yes  Patient Notified: No    Per the pharmacy, they are getting a paid claim for the Micro[place] Lancets from [employer] and I was told disregards the request for PA.

## 2019-12-20 NOTE — PATIENT INSTRUCTIONS
"  SUBJECTIVE:   Christiano Metcalf is a 49 year old male who presents to clinic today for the following health issues:    HPI  Heart Burn:  Has been having daily symptoms for the past couple of weeks.  Reports upset stomach and a sharp substernal pain associated with \"a nasty taste in [his] mouth.\"  He has had symptoms like this in the past but never this frequent.  He tries to avoid spicy foods.  He drinks four 12 oz cans of Dr. Pepper per day.  He denies alcohol consumption.  He has not noticed a difference in his symptoms with acidic foods or chocolate.  Reports dull pain with swallowing but no difficulty swallowing.  Has tried Rolaids, TUMS, charlotte seltzer, and bread without improvement.  He reports that TUMS used to improve his symptoms but have not worked recently.     Past Medical History:   Diagnosis Date     Gout     No Comments Provided     Hypomagnesemia     No Comments Provided     Pain in left knee     No Comments Provided     Personal history of other (healed) physical injury and trauma     2000,repair     Unspecified injury of unspecified wrist, hand and finger(s), initial encounter     1999      Past Surgical History:   Procedure Laterality Date     ARTHROSCOPY KNEE      Right knee meniscal repair, arthroscopic ally     ARTHROSCOPY KNEE      2010,Left knee scope     FINGER SURGERY      Right thumb ORIF     OTHER SURGICAL HISTORY      7/15/14,,HERNIA REPAIR,Left,LIH with mesh     OTHER SURGICAL HISTORY      5/10/16,,HERNIA REPAIR,Right,RIH with plug/patch     Family History   Problem Relation Age of Onset     Diabetes Mother         Diabetes     Other - See Comments Father         episode of gout.     Colon Cancer Father         Cancer-colon,diagnosed in early 60s     Family History Negative Son         Good Health,1997     Family History Negative Daughter         Good Health,2009     Family History Negative Son         Good Health,2011     Social History     Tobacco Use     Smoking status: " Here is the plan from today's visit    1. Hyperlipidemia LDL goal <100    - atorvastatin (LIPITOR) 40 MG tablet; Take 1 tablet (40 mg) by mouth daily  Dispense: 30 tablet; Refill: 1  - insulin glargine (LANTUS PEN) 100 UNIT/ML pen; Inject 15 Units Subcutaneous At Bedtime  Dispense: 9 mL; Refill: 3    2. Type 2 diabetes mellitus with hyperglycemia, without long-term current use of insulin (H)    - blood glucose (COURTNEY CONTOUR NEXT) test strip; Use to test blood sugar 1-3 times daily or as directed.  Dispense: 100 strip; Refill: 12  - blood glucose monitoring (ULTRA THIN 30G) lancets; Use to test blood sugar 1-3 times daily or as directed.  Dispense: 100 each; Refill: 0  - insulin glargine (LANTUS PEN) 100 UNIT/ML pen; Inject 15 Units Subcutaneous At Bedtime  Dispense: 9 mL; Refill: 3  - insulin pen needle (31G X 8 MM) 31G X 8 MM miscellaneous; Use Pen Needles to Inject 10 Units of Insulin (lantus) Subcutaneous At Bedtime - As directed  Dispense: 100 each; Refill: 0  - metFORMIN (GLUCOPHAGE) 500 MG tablet; Take 2 tablets (1,000 mg) by mouth 2 times daily (with meals) - NEEDS APPOINTMENT  Dispense: 120 tablet; Refill: 11  - DIABETES EDUCATOR REFERRAL  - Glucose ([facility])    3. Encounter for routine adult medical exam with abnormal findings    - insulin glargine (LANTUS PEN) 100 UNIT/ML pen; Inject 15 Units Subcutaneous At Bedtime  Dispense: 9 mL; Refill: 3    Understanding Diabetes Type 2:     When your body is working normally, the food you eat is digested and broken down, resulting in a sugar called glucose. Some of this glucose is stored in the liver, however most of it enters the blood stream and travels to the cells to be used as fuel. Glucose needs the help of a hormone, insulin, to enter the cells. Insulin is a made in the pancreas. It is released into the bloodstream in response to glucose in the blood.  Think of insulin as a key. When insulin reaches a cell, it attaches to that cell wall, and this signals the  "Former Smoker     Types: Cigarettes     Last attempt to quit: 2010     Years since quittin.8     Smokeless tobacco: Never Used   Substance Use Topics     Alcohol use: No     Alcohol/week: 0.0 standard drinks     Comment: Alcoholic Drinks/day: seldom     Current Outpatient Medications   Medication Sig Dispense Refill     albuterol (PROAIR HFA/PROVENTIL HFA/VENTOLIN HFA) 108 (90 Base) MCG/ACT inhaler Inhale 2 puffs into the lungs 4 times daily as needed 2 Inhaler 11     allopurinol (ZYLOPRIM) 300 MG tablet Take 1 tablet (300 mg) by mouth daily 90 tablet 3     betamethasone dipropionate (DIPROSONE) 0.05 % external cream Apply topically At Bedtime To scalp, wash out in the AM 45 g 3     cetirizine (ZYRTEC) 10 MG tablet Take 1 tablet (10 mg) by mouth daily 90 tablet 3     HUMIRA PEN 40 MG/0.8ML pen kit Inject 40 mg as directed every 14 days       indomethacin (INDOCIN) 50 MG capsule Take 1 capsule (50 mg) by mouth 3 times daily (with meals) 60 capsule 1     naproxen (NAPROSYN) 500 MG tablet Take 1 tablet (500 mg) by mouth 2 times daily (with meals) 180 tablet 3     omeprazole (PRILOSEC) 40 MG DR capsule Take 1 capsule (40 mg) by mouth daily 30 capsule 1     sertraline (ZOLOFT) 100 MG tablet Take 1 tablet (100 mg) by mouth daily 90 tablet 3     triamcinolone (KENALOG) 0.1 % external cream Apply topically 2 times daily 453.6 g 1     Allergies   Allergen Reactions     Seasonal      Other reaction(s): Sneezing  Other reaction(s): Sneezing     No Clinical Screening - See Comments Nausea and Vomiting     Peas  Peas     Review of Systems   Constitutional: Negative for chills and fever.   Gastrointestinal: Positive for heartburn. Negative for nausea and vomiting.      OBJECTIVE:     BP (!) 148/106   Pulse 57   Temp 98.1  F (36.7  C) (Temporal)   Resp 16   Ht 1.74 m (5' 8.5\")   Wt 103 kg (227 lb)   SpO2 96%   BMI 34.01 kg/m    Body mass index is 34.01 kg/m .  Physical Exam  Constitutional:       General: He is not " cell to open, allowing glucose to enter the cell.    When you have Diabetes type 2: - your cells don t respond very well to insulin. Because of this, less glucose goes into cells. This is called  insulin resistance .  In response to this, the pancreas makes more insulin, but eventually the pancreas can t make enough to overcome the insulin resistance. As less glucose enters the cells, it builds up in the bloodstream. This is known as hyperglycemia or high blood sugar. The cells then become starved for energy - which can make one feeling tired and rundown.     If high blood sugars are not controlled, blood vessels throughout the body become damaged. Longterm high blood sugar levels affects organs and nerves - as a result, there is risk to the heart, kidneys, eyes and limbs. Diabetes can also make other illnesses worse - such as high blood pressure and high cholesterol. Over time, people with uncontrolled high blood sugars have a high chance of dying or becoming disabled by heart attack or stroke.     Your actual blood sugar level is a result of the balance between several factors  - for example, what kind of food you eat and how much exercise you get and how much insulin is present in your body. Eating too much of the wrong foods, and not taking your diabetes meds on time can cause high blood sugar. Infections can also cause high blood sugar. Missing meals or not eating enough can lead to low blood sugar.   Normal blood sugar levels are  in the morning on an empty stomach, and not more than 180 two hours after a meal.     You need prompt medical attention if any of the following occur:   - Blood sugars are too HIGH: frequent urination, dizziness, drowsiness, thirst, headaches, nausea, vomiting, abdominal pain, vision changes, fast breathing or confusion.   - Blood sugars are too LOW: fatigue, headache, shakes, excess sweating, hunger, feeling restless or anxious, vision changes, drowsiness, weakness, confusion  in acute distress.     Appearance: Normal appearance. He is not ill-appearing.   Cardiovascular:      Rate and Rhythm: Normal rate and regular rhythm.      Heart sounds: No murmur. No friction rub. No gallop.    Pulmonary:      Effort: Pulmonary effort is normal.      Breath sounds: Normal breath sounds. No wheezing, rhonchi or rales.   Abdominal:      General: Bowel sounds are normal. There is no distension.      Palpations: Abdomen is soft.      Tenderness: There is no guarding or rebound.      Comments: Mild epigastric tenderness.   Neurological:      Mental Status: He is alert.       ASSESSMENT/PLAN:     1. Gastroesophageal reflux disease, esophagitis presence not specified  History of acid reflux symptoms previously improved with TUMS but now uncontrolled.  No red flag symptoms.  Start 6-8 week trial of PPI therapy.  Return if symptoms not improving or if worsening as this indicates need for further work-up.  Counseled on lifestyle modifications to help control symptoms.  - omeprazole (PRILOSEC) 40 MG DR capsule; Take 1 capsule (40 mg) by mouth daily  Dispense: 30 capsule; Refill: 1      DO [facility]     or loss of consciousness.   - Chest pain occurs  - Dizziness, fainting, weakness of an arm or leg or one side of face  - Trouble with speech or vision      HOME CARE:     - Follow your prescribed diabetic diet and take your medication or insulin exactly as ordered.   - Monitor blood sugars as advised and keep a log of your results. This will help your doctor guide the medication to keep your blood sugar under control.   - Try to achieve your ideal weight - proper diet and exercise can reduce or eliminate the need to take DM meds.   - Pay attention to good foot care. Check your feet and between your toes at least once a week.   - Wear a medical alert bracelet or carry a card in your wallet, indicating that you are diabetic. In the event that you become ill and are unable to give this information, it will help medical personnel provide proper care.     Always carry a source of fast acting sugar with you in case you get symptoms of low blood sugar (below 70). At the first sign of low blood sugar, eat or drink 15-20 g of fast acting sugar to raise your blood sugar. Example:   - 3 to 4 glucose tablets  - 4 ounces of regular soft drinks  - 4 ounces of any fruit juice  - 8 ounces of milk  - 5-6 pieces of hard candy  - 1 tablespoon of honey  Check your blood sugar 15 minutes after treating yourself. If it is still low (below 70), take another 15-20 grams of fast acting sugar. Test again in 15 minutes; if it returns to normal (70+) eat a snack or a meal to keep your blood sugar in safe range. If it remains low, call your doctor or go to an emergency room.       Using Injected Insulin   You can t take insulin by mouth because the acids in your stomach would destroy it. Most people take insulin by injection, or shots. Your health care team will show you how to give yourself insulin injections. Use the steps below as a reminder.     Types of Insulin     There are many types of insulin. Here are types you may use:      Fast-acting insulin. Fast-acting insulin must be taken with meals. Take it within 15 minutes before meals.     Intermediate-acting insulin. Intermediate-acting insulin takes longer to start working than fast-acting insulin. But it stays in your bloodstream longer.     Long-acting insulin. Long-acting insulin is in your bloodstream at all times. It helps keep your blood sugar levels within target range for long periods.  Where to Place Your Injections       Insulin is most often injected into the fat over the abdomen, where it is best absorbed.     Change the injection site each time you give yourself insulin. This helps prevent skin problems.     Have an organized way of moving from site to site. Use all the sites in the same area before moving on to the next. Or use the same area, but not the same site, for injections given at the same time of day.     Keep about 1 inch between sites.     Leave at least 2 inches around your belly button.   When to Inject Your Insulin     Try to inject your long-acting insulin at the same time every day.     Eat 5 to 15 minutes after injecting your meal insulin, or a mixed insulin, which includes the meal insulin. (This will depend on the type of insulin you take.)  Getting Ready     Wash your hands. Use soap and warm water.     Gather your supplies. You need a clean needle, your insulin, alcohol wipes, and a sharps container.   Injecting the Insulin     Gently pinch up about 1 inch of skin. Do not squeeze the skin.     Insert the needle into the location that you were taught to inject.     Push in the pen. Press until the full dose is given. Let go of the skin. Then withdraw the needle. Don t rub the site after you remove the needle.  Disposing of the Needle     After you inject, put the needle and directly in a sharps container. And don t recap the needle.     When the sharps container is full, put it into a garbage bag and secure the top. Label the bag  needles  or  sharps.     Storing Your Insulin     Insulin must be kept cool for it to work properly. Store unopened pens in the refrigerator. An open pen can be stored at room temperature (such as on the kitchen counter). But don t let the insulin get too hot. Always keep it below 86 F (30 C). And never let it freeze!     Always use insulin before the expiration date on the bottle. Throw pens away after that date, even if you haven t opened them yet.     In addition to having an expiration date, insulin must be used within 28 days of opening the pen. Write the date you opened it on the bottle, as a reminder. Even if you haven t used it up, after 28 days throw it away and open a new bottle.     When you travel, take all of your supplies with you. Put them in an insulated bag, and keep the bag with you. Never put your supplies in luggage that you check on an airplane or a bus.     Never leave insulin or needles in the car. They can get too hot or too cold or get lost.    Insulin Reaction (Low Blood Sugar)   This occurs when insulin causes your blood sugar to go too low (hypoglycemia). It may happen if you take too much insulin. It can also occur from taking your usual amount of insulin, but not eating enough food due to vomiting or loss of appetite. Other causes include heavy exercise, strong emotions, missing meals, and alcohol use.    Home Care:     Learn the warning signals your body gives as your blood sugar starts to drop. See below.     Always carry a source of fast-acting sugar with you in case you get symptoms of low blood sugar again. At the first sign of low blood sugar, eat or drink 15 to 20 grams of fast-acting sugar to raise your blood sugar. Examples include:     3 to 4 glucose tablets (found at most drugstores)     4 ounces (1/2 cup) of non-diet cola drinks (Coke, Pepsi, root beer, etc.)     4 ounces (1/2 cup) of fruit juice     2 tablespoons of raisins     1 tablespoon of honey    Check your blood sugar 15 minutes after  treating yourself. If it is still low, take another 15 to 20 grams of fast-acting sugar. Test again in 15 minutes. If it s still low, go to an emergency room.     Once your blood sugar returns to normal, eat a snack or meal to keep your blood sugar in a safe range.     In the future, if you are not able to eat your normal amount at each meal due to illness or vomiting, you MUST reduce your insulin dose. Contact your doctor to ask for a temporary adjustment of your dose. If you cannot eat, and there is a delay in reaching your doctor, reduce your daily insulin dose to one-half of what you usually take. Check your blood sugar every 4-6 hours. Do this until you are able to begin eating normal amounts again.     Wear a medical alert bracelet or carry a card in your wallet explaining that you are diabetic. In the event that you have a severe hypoglycemic reaction and are unable to give this information, it will help medical personnel provide proper care.    Get Prompt Medical Attention   if any of the following occur:     HIGH BLOOD SUGAR: frequent urination, dizziness, drowsiness, thirst, headache, nausea or vomiting, abdominal pain, vision changes, fast breathing, confusion or loss of consciousness     LOW BLOOD SUGAR: fatigue, headache, shakes, excess sweating, hunger, feeling anxious or restless, vision changes, drowsiness, weakness, confusion, or loss of consciousness      Using a Blood Sugar Log   To help manage your diabetes, you ll need to check your blood sugar level as directed by your health care provider. Keeping a log of your blood sugar levels will help you track your blood sugar readings. It s a simple and easy way to see how well you are controlling your diabetes.       Checking Your Blood Sugar Level   You can check your blood sugar level with a blood glucose meter. The meter supplies a reading that tells you the level of your blood sugar. Your readings should be in your target range as often as  possible. This means not too high or too low. Staying in this range helps reduce your risk of complications. Your doctor will help you figure out your ideal target range.    Tracking Your Readings   Every time you check your blood sugar, use your log to keep track of your readings. You may be advised by your doctor to check your blood sugar in the morning, at bedtime, and before and after meals. Be sure to write down ALL of your numbers. Also, use your log to record things that might have affected your blood sugar. Some examples include being sick, being physically active, feeling stressed, or skipping meals.   Lessons [place] from Your Readings   Tracking your blood sugar readings helps you identify patterns. These patterns tell you how your actions affect your blood sugar. For instance, you may have higher numbers after eating certain foods or lower numbers after exercise. Keep in mind that there are no  good  or  bad  numbers. They just help you understand how to stay in your target range more often, so that your diabetes remains in good control.   Sharing Your Log with Your Health Care Team   Bring your blood sugar log and glucose meter with you to all of your health care appointments. It can help your doctor and other members of your health care team make adjustments to your treatment plan, if needed. This may involve making changes in what you eat, what medications you take, or how much you exercise.   To Learn More   The resources below can help you learn more:     American Diabetes Association [phone] www.diabetes.org      [facility] [phone] www.hormone.org        Please call or return to clinic if your symptoms don't go away.    Follow up plan      Thank you for coming to [facility] today.  Lab Testing:  **If you had lab testing today and your results are reassuring or normal they will be mailed to you or sent through [employer] within 7 days.   **If the lab tests need quick action  we will call you with the results.  The phone number we will call with results is # [phone] (home) . If this is not the best number please call our clinic and change the number.  Medication Refills:  If you need any refills please call your pharmacy and they will contact us.   If you need to  your refill at a new pharmacy, please contact the new pharmacy directly. The new pharmacy will help you get your medications transferred faster.   Scheduling:  If you have any concerns about today's visit or wish to schedule another appointment please call our office during normal business hours [phone] (8-5:00 M-F)  If a referral was made to a [facility] and you don't get a call from central scheduling please call [phone].  If a Mammogram was ordered for you at [facility] call [phone] to schedule or change your appointment.  If you had an XRay/CT/Ultrasound/MRI ordered the number is [phone] to schedule or change your radiology appointment.   Medical Concerns:  If you have urgent medical concerns please call [phone] at any time of the day.    Gato Bear, DO    Diabetes Education Referral    10/4 @ 8:30am     3 day Log food/drink  Glucometer  [address] [facility] [place]

## 2020-02-03 DIAGNOSIS — E11.65 TYPE 2 DIABETES MELLITUS WITH HYPERGLYCEMIA, WITHOUT LONG-TERM CURRENT USE OF INSULIN (H): ICD-10-CM

## 2020-02-03 NOTE — TELEPHONE ENCOUNTER
"Request for medication refill: insulin pen needle (31G X 8 MM) 31G X 8 MM miscellaneous    Providers if patient needs an appointment and you are willing to give a one month supply please refill for one month and  send a letter/MyChart using \".SMILLIMITEDREFILL\" .smillimited and route chart to \"P SMI \" (Giving one month refill in non controlled medications is strongly recommended before denial)    If refill has been denied, meaning absolutely no refills without visit, please complete the smart phrase \".smirxrefuse\" and route it to the \"P SMI MED REFILLS\"  pool to inform the patient and the pharmacy.    Ryanne Frazier, CMA        "

## 2020-04-01 DIAGNOSIS — E78.5 HYPERLIPIDEMIA LDL GOAL <100: ICD-10-CM

## 2020-04-01 NOTE — TELEPHONE ENCOUNTER
"Request for medication refill: atorvastatin (LIPITOR) 40 MG tablet     Providers if patient needs an appointment and you are willing to give a one month supply please refill for one month and  send a letter/MyChart using \".SMILLIMITEDREFILL\" .smillimited and route chart to \"P SMI \" (Giving one month refill in non controlled medications is strongly recommended before denial)    If refill has been denied, meaning absolutely no refills without visit, please complete the smart phrase \".smirxrefuse\" and route it to the \"P SMI MED REFILLS\"  pool to inform the patient and the pharmacy.    Elvira Kam MA          "

## 2020-04-03 RX ORDER — ATORVASTATIN CALCIUM 40 MG/1
40 TABLET, FILM COATED ORAL DAILY
Qty: 30 TABLET | Refills: 1 | OUTPATIENT
Start: 2020-04-03

## 2020-04-03 NOTE — TELEPHONE ENCOUNTER
Attempted to reach patient to schedule telephone visit. LVM with call back to schedule.     Vandana Crane, Senior Patient Representative/

## 2020-04-30 ENCOUNTER — TELEPHONE (OUTPATIENT)
Dept: FAMILY MEDICINE | Facility: CLINIC | Age: 40
End: 2020-04-30

## 2020-04-30 NOTE — TELEPHONE ENCOUNTER
Unable to leave message phone no longer in service.     Tati Grande CMA  [facility] Care Coordinator

## 2020-04-30 NOTE — LETTER
May 12, 2020    Erika Padilla  [address]  [place] [address]      Dear: Erika Padilla    You were recently referred for a follow-up appointment by your primary care provider at [facility].  We have called twice to schedule this appointment, but have been unable to reach you.  If you are interested in receiving this service, please call [facility] at [phone].  We would be happy to schedule this appointment for you.      Thank you.      Sincerely,    Patient Representative    [facility]  Hours:  Monday-Friday 8:00 am - 5:00 pm   Phone Number: [phone]

## 2020-07-02 DIAGNOSIS — E11.65 TYPE 2 DIABETES MELLITUS WITH HYPERGLYCEMIA, WITHOUT LONG-TERM CURRENT USE OF INSULIN (H): ICD-10-CM

## 2020-07-02 DIAGNOSIS — E78.5 HYPERLIPIDEMIA LDL GOAL <100: ICD-10-CM

## 2020-07-02 NOTE — TELEPHONE ENCOUNTER
"Request for medication refill: insulin glargine (LANTUS PEN) 100 UNIT/ML pen     Providers if patient needs an appointment and you are willing to give a one month supply please refill for one month and  send a letter/MyChart using \".SMILLIMITEDREFILL\" .smillimited and route chart to \"P SMI \" (Giving one month refill in non controlled medications is strongly recommended before denial)    If refill has been denied, meaning absolutely no refills without visit, please complete the smart phrase \".smirxrefuse\" and route it to the \"P SMI MED REFILLS\"  pool to inform the patient and the pharmacy.    Ryanne Frazier, CMA        "

## 2020-08-18 ENCOUNTER — OFFICE VISIT (OUTPATIENT)
Dept: FAMILY MEDICINE | Facility: CLINIC | Age: 40
End: 2020-08-18
Payer: COMMERCIAL

## 2020-08-18 VITALS
SYSTOLIC BLOOD PRESSURE: 115 MMHG | WEIGHT: 238 LBS | HEIGHT: 75 IN | OXYGEN SATURATION: 96 % | HEART RATE: 60 BPM | TEMPERATURE: 98 F | DIASTOLIC BLOOD PRESSURE: 84 MMHG | RESPIRATION RATE: 16 BRPM | BODY MASS INDEX: 29.59 KG/M2

## 2020-08-18 DIAGNOSIS — E78.5 HYPERLIPIDEMIA LDL GOAL <100: ICD-10-CM

## 2020-08-18 DIAGNOSIS — Z00.00 ROUTINE GENERAL MEDICAL EXAMINATION AT A HEALTH CARE FACILITY: Primary | ICD-10-CM

## 2020-08-18 DIAGNOSIS — F17.200 SMOKER: ICD-10-CM

## 2020-08-18 DIAGNOSIS — E11.65 TYPE 2 DIABETES MELLITUS WITH HYPERGLYCEMIA, WITHOUT LONG-TERM CURRENT USE OF INSULIN (H): ICD-10-CM

## 2020-08-18 LAB
ALBUMIN SERPL-MCNC: 4.6 MG/DL (ref 3.8–5)
ALP SERPL-CCNC: 65 U/L (ref 31.7–110.7)
ALT SERPL-CCNC: 8.8 U/L (ref 0–45)
AST SERPL-CCNC: 7.4 U/L (ref 0–55)
BILIRUB SERPL-MCNC: 0.5 MG/DL (ref 0.2–1.3)
BUN SERPL-MCNC: 11.6 MG/DL (ref 7–21)
CALCIUM SERPL-MCNC: 10 MG/DL (ref 8.5–10.1)
CHLORIDE SERPLBLD-SCNC: 97.3 MMOL/L (ref 98–110)
CHOLEST SERPL-MCNC: 202 MG/DL
CO2 SERPL-SCNC: 22.3 MMOL/L (ref 20–32)
CREAT SERPL-MCNC: 0.5 MG/DL (ref 0.7–1.3)
CREAT UR-MCNC: 80 MG/DL
GFR SERPL CREATININE-BSD FRML MDRD: >90 ML/MIN/1.7 M2
GLUCOSE SERPL-MCNC: 433.4 MG'DL (ref 70–99)
HBA1C MFR BLD: 11.5 % (ref 4.1–5.7)
HDLC SERPL-MCNC: 37 MG/DL
LDLC SERPL CALC-MCNC: 125 MG/DL
MICROALBUMIN UR-MCNC: <5 MG/L
MICROALBUMIN/CREAT UR: NORMAL MG/G CR (ref 0–17)
NONHDLC SERPL-MCNC: 166 MG/DL
POTASSIUM SERPL-SCNC: 4.1 MMOL/L (ref 3.3–4.5)
PROT SERPL-MCNC: 7.3 G/DL (ref 6.8–8.8)
SODIUM SERPL-SCNC: 133.7 MMOL/L (ref 132.6–141.4)
TRIGL SERPL-MCNC: 204 MG/DL

## 2020-08-18 RX ORDER — ATORVASTATIN CALCIUM 40 MG/1
TABLET, FILM COATED ORAL
Qty: 1 TABLET | Refills: 0 | COMMUNITY
Start: 2020-08-18 | End: 2021-03-30

## 2020-08-18 RX ORDER — GLIPIZIDE 10 MG/1
10 TABLET, FILM COATED, EXTENDED RELEASE ORAL DAILY
Qty: 30 TABLET | Refills: 3 | Status: SHIPPED | OUTPATIENT
Start: 2020-08-18 | End: 2021-03-30

## 2020-08-18 ASSESSMENT — MIFFLIN-ST. JEOR: SCORE: 2075.19

## 2020-08-18 NOTE — PROGRESS NOTES
Male Physical Note          HPI         Concerns today:     1. DM  Lab Results   Component Value Date    A1C 11.5 08/18/2020    A1C 11.6 08/19/2019    A1C 10.8 08/20/2018    A1C 9.3 12/28/2016    A1C 9.8 12/16/2016     After A1c returned, admitted he has not taken any meds for an extended period of time.  Long discussion    Meds  Metformin 1000mg BID  Insulin lantus  20 units    FSBS - 90s AM, 90s Bedtime      2. Shoulder pain  When laying on side    3. Cholesterol  Stopped after 3 months        Patient Active Problem List   Diagnosis     Routine general medical examination at a health care facility     Testicular atrophy     Nodule of testis     Atrophy of both testes     Low volume of ejaculated semen     Type 2 diabetes mellitus with hyperglycemia, without long-term current use of insulin (H)     Hyperlipidemia LDL goal <100       Past Medical History:   Diagnosis Date     NO ACTIVE PROBLEMS        Previous Medical Care   None     Family History   Problem Relation Age of Onset     Asthma Mother      Asthma Father      Hypertension Father      Diabetes Father      Arthritis Father      Asthma Brother               Review of Systems:     Review of Systems:  CONSTITUTIONAL: NEGATIVE for fever, chills, change in weight  INTEGUMENTARY/SKIN: NEGATIVE for worrisome rashes, moles or lesions  EYES: NEGATIVE for vision changes or irritation  ENT/MOUTH: NEGATIVE for ear, mouth and throat problems  RESP: NEGATIVE for significant cough or SOB  BREAST: NEGATIVE for masses, tenderness or discharge  CV: NEGATIVE for chest pain, palpitations or peripheral edema  GI: NEGATIVE for nausea, abdominal pain, heartburn, or change in bowel habits  : NEGATIVE for frequency, dysuria, or hematuria  MUSCULOSKELETAL: Positive for significant arthralgias or myalgia--Right shoulder pain when sleeping on that side  NEURO: NEGATIVE for weakness, dizziness or paresthesias  ENDOCRINE: NEGATIVE for temperature intolerance, skin/hair  changes  HEME/ALLERGY: NEGATIVE for bleeding problems  PSYCHIATRIC: NEGATIVE for changes in mood or affect  Sleep:   Do you snore most or the night (as reported by a family member)? No  Do you feel sleepy or extremely tired during most of the day? No             Social History     Social History     Socioeconomic History     Marital status: Single     Spouse name: Not on file     Number of children: Not on file     Years of education: Not on file     Highest education level: Not on file   Occupational History     Not on file   Social Needs     Financial resource strain: Not on file     Food insecurity     Worry: Not on file     Inability: Not on file     Transportation needs     Medical: Not on file     Non-medical: Not on file   Tobacco Use     Smoking status: Current Every Day Smoker     Types: Hookah     Smokeless tobacco: Never Used   Substance and Sexual Activity     Alcohol use: No     Alcohol/week: 0.0 standard drinks     Drug use: No     Sexual activity: Yes     Partners: Female     Birth control/protection: Condom   Lifestyle     Physical activity     Days per week: Not on file     Minutes per session: Not on file     Stress: Not on file   Relationships     Social connections     Talks on phone: Not on file     Gets together: Not on file     Attends Muslim service: Not on file     Active member of club or organization: Not on file     Attends meetings of clubs or organizations: Not on file     Relationship status: Not on file     Intimate partner violence     Fear of current or ex partner: Not on file     Emotionally abused: Not on file     Physically abused: Not on file     Forced sexual activity: Not on file   Other Topics Concern     Parent/sibling w/ CABG, MI or angioplasty before 65F 55M? Not Asked   Social History Narrative     Not on file       Marital Status:Single  Who lives in your household? Lives alone    Has anyone hurt you physically, for example by pushing, hitting, slapping or kicking you  "or forcing you to have sex? Denies  Do you feel threatened or controlled by a partner, ex-partner or anyone in your life? Denies    Sexual Health     Sexual concerns: No   STI History: Neg      Recommended Screening     Cholesterol Level (>46 yo or at risk):  Recommended and patient accepted testing.               Physical Exam:     Vitals: /84   Pulse 60   Temp 98  F (36.7  C) (Oral)   Resp 16   Ht 1.905 m (6' 3\")   Wt 108 kg (238 lb)   SpO2 96%   BMI 29.75 kg/m    BMI= Body mass index is 29.75 kg/m .  GENERAL: healthy, alert and no distress  NECK: no tenderness, no adenopathy, no asymmetry, no masses, no stiffness; thyroid- normal to palpation  RESP: lungs clear to auscultation - no rales, no rhonchi, no wheezes  CV: regular rates and rhythm, normal S1 S2, no S3 or S4 and no murmur, no click or rub -  ABDOMEN: soft, no tenderness, no  hepatosplenomegaly, no masses, normal bowel sounds  MS: extremities- no gross deformities noted, no edema  SKIN: no suspicious lesions, no rashes  NEURO: strength and tone- normal, sensory exam- grossly normal, mentation- intact, speech- normal, reflexes- symmetric  PSYCH: Alert and oriented times 3; speech- coherent , normal rate and volume; able to articulate logical thoughts, able to abstract reason, no tangential thoughts, no hallucinations or delusions, affect- normal  LYMPHATICS: ant. cervical- normal, post. cervical- normal, axillary- normal, supraclavicular- normal, inguinal- normal    Assessment and Plan      1. Routine general medical examination at a health care facility  Really here for DM    2. Type 2 diabetes mellitus with hyperglycemia, without long-term current use of insulin (H)  Has not been taking any medications  Long discussion  , so insulin not an option    Metformin 1000mg bid. Ramp up  Glipizide xl 10mg every day   Diet - plate method  Exercise - walk 2 miles /day    - Comprehensive Metabolic Panel ([facility])  - Lipid panel " reflex to direct LDL Non-fasting  - Hemoglobin A1c ([facility])  - Albumin Random Urine Quantitative with Creat Ratio  - Hemoglobin A1c ([facility]); Future  - metFORMIN (GLUCOPHAGE) 500 MG tablet; Take 2 tablets (1,000 mg) by mouth 2 times daily (with meals)  Dispense: 120 tablet; Refill: 3  - glipiZIDE (GLUCOTROL XL) 10 MG 24 hr tablet; Take 1 tablet (10 mg) by mouth daily  Dispense: 30 tablet; Refill: 3    3. Hyperlipidemia LDL goal <100  - atorvastatin (LIPITOR) 40 MG tablet; Not taking 8/18/20 - ask again later  Dispense: 1 tablet; Refill: 0    4. Smoker  Discuss next visit    5.  RTC 1month  Future lab order for A1c  Get lab done when he gets into town  Video visit after A1c  Discuss smoking        Options for treatment and follow-up care were reviewed with the patient. Erika Padilla and/or guardian engaged in the decision making process and verbalized understanding of the options discussed and agreed with the final plan.      Remingtoner    40min spent with the patient, >50% in arranging care for diabetes  [place]

## 2020-08-18 NOTE — PATIENT INSTRUCTIONS
"Diabetes Information    MY SELF MANAGEMENT GOAL(S):    1. Exercise 2 miles /day  2. Diet - portion portion portion; plate method  3. Start medications     -- metformin - ramp    -- Glipizide XL 10mg daily    Ramp up  Week 1  - one metformin pill at dinner   Week 2  - one metformin at lunch and one at dinner  Week 3  - one metfromin at lunch and two at dinner  Week 4 - two at lunch and two at dinner    Start Glipidize on Wednesday    Blood work in 1 month  Call for \"lab only\" appt  Make a Video appointment with Luzmaria    GENERAL TARGETS FOR YOUR DIABETES CARE:    A1c   Mine is:   Lab Results   Component Value Date    A1C 11.5 08/18/2020    Goal: is at least under 7.0 (higher for some people)  Check it: Every 3 to 6 months  Why:  Shows how well your blood glucose was controlled over the past 3 months    Blood pressure   Mine was 115/84 today  Goal: Under 140/90  Check it:  At every clinic check up for diabetes  Why:  May prevent stroke, heart disease and eye and kidney diseases    Cholesterol   Mine is:   Lab Results   Component Value Date     08/19/2019    Goal:  Goal LDL (bad cholesterol) is at least under 100 (lower for many people), all people with diabetes should be taking a statin medicine  Check it:  Every year  Why:  Helps prevent heart attacks and stroke    Aspirin  Goal:  If you are age 50 or older, ask your doctor if you should take aspirin  Take it: Every day, or as prescribed  Why: Lowers the risk of heart attack and stroke    Smoking and tobacco  Goal: No tobacco use  Why: Tobacco is a major risk for heart disease    Kidney test (urine microalbumin)  Goal:  Under 30  Do it:  Every year  Why:  Finds kidney problems before they get worse    Foot exam   Goal:  No cuts or sores, normal sensation  Do it: Remove shoes and socks at every visit; have a monofilament test every year  Why: Finds foot problems before they get worse    Eye exam   Goal:  No changes in your eyes  Do it: Every year  Why:  Finds " ----- Message from Hiltno Chavez MD sent at 4/3/2018  5:02 PM CDT -----  Please call and schedule appt.  Thanks!   eye problems before they get worse    Exercise  Goal:  150 minutes of aerobic exercises and 2 to 3 sessions of strength training  Do it: Every week  Why:  Helps manage diabetes and improve health    Diabetes education  Goal: Learn how to manage your diabetes  Do it: At least once a year--ask your doctor for a referral  Why: The more you know, the better you can manage your diabetes    Your goals may be different from what you see here. Your care team will tell you what your goals should be.   For informational purposes only. Not to replace the advice of your health care provider.     GENERAL ONLINE LINKS:  https://www.acponline.org/system/files/documents/patients_families/products/brochures/protected/living-with-diabetes-english.pdf    A comprehensive PDF that covers a variety of topics including exercise, diet, checking blood sugar, foot checks and information about medicines and insulin.    https://[employer].org/patient-education/diabetes    A great source site with information on a variety of different topics in many different languages including Cambodian and Hmong.    https://www.reg.org/ape/courselisting/onlinecourselisting.asp     EATING HEALTHY WITH DIABETES:  Plate Method                      Preventive Health Recommendations  Male Ages 40 to 49    Yearly exam:             See your health care provider every year in order to  o   Review health changes.   o   Discuss preventive care.    o   Review your medicines if your doctor has prescribed any.    You should be tested each year for STDs (sexually transmitted diseases) if you re at risk.     Have a cholesterol test every 5 years.     Have a colonoscopy (test for colon cancer) if someone in your family has had colon cancer or polyps before age 50.     After age 45, have a diabetes test (fasting glucose). If you are at risk for diabetes, you should have this test every 3 years.      Talk with your health care provider about whether or not a prostate cancer  screening test (PSA) is right for you.    Shots: Get a flu shot each year. Get a tetanus shot every 10 years.     Nutrition:    Eat at least 5 servings of fruits and vegetables daily.     Eat whole-grain bread, whole-wheat pasta and brown rice instead of white grains and rice.     Get adequate Calcium and Vitamin D.     Lifestyle    Exercise for at least 150 minutes a week (30 minutes a day, 5 days a week). This will help you control your weight and prevent disease.     Limit alcohol to one drink per day.     No smoking.     Wear sunscreen to prevent skin cancer.     See your dentist every six months for an exam and cleaning.

## 2020-08-18 NOTE — LETTER
Erika Padilla  [address] [address]    September 29, 2020        Dear Erika Padilla,   We discussed a month ago that you would stop in for an A1c test.   Please call [phone] to schedule a lab appointment soon.  Hope you are doing well and that your diabetes is coming under better control      Sincerely    PHarper

## 2020-08-18 NOTE — LETTER
August 20, 2020      Erika Padilla  [address]  [place] [address]        Dear Erika,    Thank you for getting your care at [facility]. Please see below for your test results.  Your A1c was very high at 11.5.   Your liver and kidneys are good  Your cholesterol was high. We will discuss cholesterol medication in the future  But the first step is to get your glucose in better control. Please take the metformin and glipizide. Exercise daily. Watch the diet.     Resulted Orders   Comprehensive Metabolic Panel ([facility])   Result Value Ref Range    Calcium 10.0 8.5 - 10.1 mg/dL    Chloride 97.3 (L) 98.0 - 110.0 mmol/L    Carbon Dioxide 22.3 20.0 - 32.0 mmol/L    Creatinine 0.5 (L) 0.7 - 1.3 mg/dL    Glucose 433.4 (HH) 70.0 - 99.0 mg'dL    Potassium 4.1 3.3 - 4.5 mmol/L    Sodium 133.7 132.6 - 141.4 mmol/L    Protein Total 7.3 6.8 - 8.8 g/dL    GFR Estimate >90 >60.0 mL/min/1.7 m2    GFR Estimate If Black >90 >60.0 mL/min/1.7 m2    Albumin 4.6 3.8 - 5.0 mg/dL    Alkaline Phosphatase 65.0 31.7 - 110.7 U/L    ALT 8.8 0.0 - 45.0 U/L    AST 7.4 0.0 - 55.0 U/L    Bilirubin Total 0.5 0.2 - 1.3 mg/dL    Urea Nitrogen 11.6 7.0 - 21.0 mg/dL    Narrative    Critical Value was reported to and read back by Dr. Dutta at 8/18/2020 3:34   PM MR   Lipid panel reflex to direct LDL Non-fasting   Result Value Ref Range    Cholesterol 202 (H) <200 mg/dL      Comment:      Desirable:       <200 mg/dl    Triglycerides 204 (H) <150 mg/dL      Comment:      Borderline high:  150-199 mg/dl  High:             200-499 mg/dl  Very high:       >499 mg/dl      HDL Cholesterol 37 (L) >39 mg/dL    LDL Cholesterol Calculated 125 (H) <100 mg/dL      Comment:      Above desirable:  100-129 mg/dl  Borderline High:  130-159 mg/dL  High:             160-189 mg/dL  Very high:       >189 mg/dl      Non HDL Cholesterol 166 (H) <130 mg/dL      Comment:      Above Desirable:  130-159 mg/dl  Borderline high:  160-189 mg/dl  High:              190-219 mg/dl  Very high:       >219 mg/dl     Hemoglobin A1c ([facility])   Result Value Ref Range    Hemoglobin A1C 11.5 (H) 4.1 - 5.7 %   Albumin Random Urine Quantitative with Creat Ratio   Result Value Ref Range    Creatinine Urine 80 mg/dL    Albumin Urine mg/L <5 mg/L    Albumin Urine mg/g Cr Unable to calculate due to low value 0 - 17 mg/g Cr           Sincerely,    Nito Dutta MD

## 2021-03-30 ENCOUNTER — OFFICE VISIT (OUTPATIENT)
Dept: FAMILY MEDICINE | Facility: CLINIC | Age: 41
End: 2021-03-30
Payer: COMMERCIAL

## 2021-03-30 VITALS
BODY MASS INDEX: 29.1 KG/M2 | WEIGHT: 232.8 LBS | RESPIRATION RATE: 16 BRPM | TEMPERATURE: 98.7 F | OXYGEN SATURATION: 96 % | SYSTOLIC BLOOD PRESSURE: 114 MMHG | HEART RATE: 69 BPM | DIASTOLIC BLOOD PRESSURE: 79 MMHG

## 2021-03-30 DIAGNOSIS — E11.65 TYPE 2 DIABETES MELLITUS WITH HYPERGLYCEMIA, WITHOUT LONG-TERM CURRENT USE OF INSULIN (H): Primary | ICD-10-CM

## 2021-03-30 DIAGNOSIS — E78.5 HYPERLIPIDEMIA LDL GOAL <100: ICD-10-CM

## 2021-03-30 LAB
ALBUMIN SERPL-MCNC: 4.3 MG/DL (ref 3.8–5)
ALP SERPL-CCNC: 58.1 U/L (ref 31.7–110.7)
ALT SERPL-CCNC: 8.3 U/L (ref 0–45)
AST SERPL-CCNC: 10.2 U/L (ref 0–55)
BILIRUB SERPL-MCNC: 0.5 MG/DL (ref 0.2–1.3)
BUN SERPL-MCNC: 9.4 MG/DL (ref 7–21)
CALCIUM SERPL-MCNC: 9.2 MG/DL (ref 8.5–10.1)
CHLORIDE SERPLBLD-SCNC: 98.3 MMOL/L (ref 98–110)
CHOLEST SERPL-MCNC: 182.9 MG/DL (ref 0–200)
CHOLEST/HDLC SERPL: 4.9 {RATIO} (ref 0–5)
CO2 SERPL-SCNC: 27.4 MMOL/L (ref 20–32)
CREAT SERPL-MCNC: 0.5 MG/DL (ref 0.7–1.3)
CREAT UR-MCNC: 286 MG/DL
GFR SERPL CREATININE-BSD FRML MDRD: >90 ML/MIN/1.7 M2
GLUCOSE SERPL-MCNC: 311.5 MG'DL (ref 70–99)
HBA1C MFR BLD: 11.6 % (ref 4.1–5.7)
HDLC SERPL-MCNC: 37.1 MG/DL
LDLC SERPL CALC-MCNC: 117 MG/DL (ref 0–129)
MICROALBUMIN UR-MCNC: 31 MG/L
MICROALBUMIN/CREAT UR: 10.94 MG/G CR (ref 0–17)
POTASSIUM SERPL-SCNC: 3.9 MMOL/L (ref 3.3–4.5)
PROT SERPL-MCNC: 6.6 G/DL (ref 6.8–8.8)
SODIUM SERPL-SCNC: 134.9 MMOL/L (ref 132.6–141.4)
TRIGL SERPL-MCNC: 142.5 MG/DL (ref 0–150)
VLDL CHOLESTEROL: 28.5 MG/DL (ref 7–32)

## 2021-03-30 PROCEDURE — 82043 UR ALBUMIN QUANTITATIVE: CPT | Performed by: FAMILY MEDICINE

## 2021-03-30 PROCEDURE — 99214 OFFICE O/P EST MOD 30 MIN: CPT | Mod: GC | Performed by: FAMILY MEDICINE

## 2021-03-30 PROCEDURE — 80053 COMPREHEN METABOLIC PANEL: CPT | Performed by: FAMILY MEDICINE

## 2021-03-30 PROCEDURE — 80061 LIPID PANEL: CPT | Performed by: FAMILY MEDICINE

## 2021-03-30 PROCEDURE — 83036 HEMOGLOBIN GLYCOSYLATED A1C: CPT | Performed by: FAMILY MEDICINE

## 2021-03-30 PROCEDURE — 36415 COLL VENOUS BLD VENIPUNCTURE: CPT | Performed by: FAMILY MEDICINE

## 2021-03-30 RX ORDER — ORAL DOSING DEVICES
1 EACH MISCELLANEOUS WEEKLY
Qty: 1 EACH | Refills: 1 | Status: SHIPPED | OUTPATIENT
Start: 2021-03-30 | End: 2023-08-21

## 2021-03-30 RX ORDER — FLASH GLUCOSE SCANNING READER
1 EACH MISCELLANEOUS ONCE
Qty: 1 EACH | Refills: 1 | Status: SHIPPED | OUTPATIENT
Start: 2021-03-30 | End: 2021-03-30

## 2021-03-30 RX ORDER — GLIPIZIDE 10 MG/1
10 TABLET, FILM COATED, EXTENDED RELEASE ORAL DAILY
Qty: 30 TABLET | Refills: 3 | Status: SHIPPED | OUTPATIENT
Start: 2021-03-30 | End: 2023-08-21

## 2021-03-30 RX ORDER — FLASH GLUCOSE SENSOR
1 KIT MISCELLANEOUS
Qty: 2 EACH | Refills: 11 | Status: SHIPPED | OUTPATIENT
Start: 2021-03-30 | End: 2023-08-21

## 2021-03-30 RX ORDER — ATORVASTATIN CALCIUM 40 MG/1
TABLET, FILM COATED ORAL
Qty: 1 TABLET | Refills: 0 | Status: SHIPPED | OUTPATIENT
Start: 2021-03-30 | End: 2023-08-21

## 2021-03-30 NOTE — LETTER
March 31, 2021      Erika Padilla  [address]  [place] [address]        Dear Erika,    Thank you for getting your care at [facility]. It was good to see again!!  Please see below for your test results.  As we discussed, your A1c was still very high. Please take the insulin at bedtime  Your cholesterol is better but still higher than we want. We will discuss increasing your dose  Your liver and kidneys are good    Resulted Orders   Hemoglobin A1c (hospitals)   Result Value Ref Range    Hemoglobin A1C 11.6 (H) 4.1 - 5.7 %   Lipid Cascade (hospitals)   Result Value Ref Range    Cholesterol 182.9 0.0 - 200.0 mg/dL    Cholesterol/HDL Ratio 4.9 0.0 - 5.0    HDL Cholesterol 37.1 (L) >40.0 mg/dL    Triglycerides 142.5 0.0 - 150.0 mg/dL    VLDL Cholesterol 28.5 7.0 - 32.0 mg/dL    LDL Cholesterol Calculated 117 0 - 129 mg/dL   Comprehensive Metabolic Panel (hospitals)   Result Value Ref Range    Calcium 9.2 8.5 - 10.1 mg/dL    Chloride 98.3 98.0 - 110.0 mmol/L    Carbon Dioxide 27.4 20.0 - 32.0 mmol/L    Creatinine 0.5 (L) 0.7 - 1.3 mg/dL    Glucose 311.5 (H) 70.0 - 99.0 mg'dL    Potassium 3.9 3.3 - 4.5 mmol/L    Sodium 134.9 132.6 - 141.4 mmol/L    Protein Total 6.6 (L) 6.8 - 8.8 g/dL    GFR Estimate >90 >60.0 mL/min/1.7 m2    GFR Estimate If Black >90 >60.0 mL/min/1.7 m2    Albumin 4.3 3.8 - 5.0 mg/dL    Alkaline Phosphatase 58.1 31.7 - 110.7 U/L    ALT 8.3 0.0 - 45.0 U/L    AST 10.2 0.0 - 55.0 U/L    Bilirubin Total 0.5 0.2 - 1.3 mg/dL    Urea Nitrogen 9.4 7.0 - 21.0 mg/dL   Albumin Random Urine Quantitative with Creat Ratio   Result Value Ref Range    Creatinine Urine 286 mg/dL    Albumin Urine mg/L 31 mg/L    Albumin Urine mg/g Cr 10.94 0 - 17 mg/g Cr           Sincerely,    Nito Dutta MD

## 2021-03-30 NOTE — PROGRESS NOTES
Assessment & Plan     Type 2 diabetes mellitus with hyperglycemia, without long-term current use of insulin (H)  Poorly controlled diabetic with multifactorial reasons, as a  as poor diet, lack of exercise, and on night shift mostly. He admits that he forgets to take his medication 2-3 days every week. A1C 11.6 from 11.5, unclear if due to missing medications versus not taking them regularly. Pt willing to take time off work in order to be on insulin short term to get sugars down. Will start 15 U lantus nightly and keep other oral medications the same. Wishes to try anel freestyle for monitoring, but while waiting for approval needs a new monitor. Would like to see diabetic educator in order to improve diet. Plans to stop drinking sweetened ice tea and drink more water.   - Hemoglobin A1c ([facility])  - Lipid Cascade ([facility])  - Comprehensive Metabolic Panel ([facility])  - Albumin Random Urine Quantitative with Creat Ratio  - AMBULATORY ADULT DIABETES EDUCATOR REFERRAL; Future  - Continuous Blood Gluc  (FREESTYLE ANEL 14 DAY READER) DANIKA; 1 each once for 1 dose Use to read blood sugars as per 's instructions.  - Continuous Blood Gluc Sensor (FREESTYLE ANEL 14 DAY SENSOR) MISC; 1 each every 14 days Change every 14 days.  - Misc. Devices (PILL BOX 7 DAY) MISC; 1 each once a week  - insulin glargine (LANTUS PEN) 100 UNIT/ML pen; Inject 15 Units Subcutaneous At Bedtime  - glipiZIDE (GLUCOTROL XL) 10 MG 24 hr tablet; Take 1 tablet (10 mg) by mouth daily  - metFORMIN (GLUCOPHAGE) 500 MG tablet; Take 2 tablets (1,000 mg) by mouth 2 times daily (with meals)  - insulin pen needle (31G X 8 MM) 31G X 8 MM miscellaneous; Use 1 pen needles daily or as directed.  - blood glucose monitoring (NO BRAND SPECIFIED) meter device kit; Use to test blood sugar 2-3 times daily or as directed.    Hyperlipidemia LDL goal <100  - Lipid [place] ([facility])  - atorvastatin (LIPITOR) 40 MG tablet; Not taking  "8/18/20 - ask again later    Review of the result(s) of each unique test - A1C  Diagnosis or treatment significantly limited by social determinants of health - lifestyle  Ordering of each unique test:[id number]}     Tobacco Cessation:   reports that he has been smoking hookah. He has never used smokeless tobacco.  Tobacco Cessation Action Plan: Information offered: Patient not interested at this time    BMI:   Estimated body mass index is 29.1 kg/m  as calculated from the following:    Height as of 8/18/20: 1.905 m (6' 3\").    Weight as of this encounter: 105.6 kg (232 lb 12.8 oz).   Weight management plan: Discussed healthy diet and exercise guidelines        Return in about 1 week (around 4/6/2021) for phone call from Pharm D. and in one month for recheck of A1C    Darlene Trejo, PGY 2 FM  [facility] ALVAREZ James is a 40 year old who presents for the following health issues   HPI     Diabetes Follow-up    How often are you checking your blood sugar? One time daily usually at night, mainly drives at night, mostly 191 or 197, drives for 10 hours, walks and sleep  What time of day are you checking your blood sugars (select all that apply)?  At bedtime  Have you had any blood sugars above 200?  Yes 263  Have you had any blood sugars below 70?  No    What symptoms do you notice when your blood sugar is low?  Last week noted that fingers were numb for a few hours     What concerns do you have today about your diabetes? None and Other: wondering about A1C     Do you have any of these symptoms? (Select all that apply)  Blurry vision,  Has eye exam next week    Have you had a diabetic eye exam in the last 12 months? No    Usually takes morning and night, but sometimes forgets. 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     Hyperlipidemia Follow-Up      Are you regularly taking any medication or supplement to lower your cholesterol?   Yes- taking it    Are you having muscle aches or " other side effects that you think could be caused by your cholesterol lowering medication?  No       BP Readings from Last 2 Encounters:   03/30/21 114/79   08/18/20 115/84     Hemoglobin A1C (%)   Date Value   03/30/2021 11.6 (H)   08/18/2020 11.5 (H)     LDL Cholesterol Calculated (mg/dL)   Date Value   08/18/2020 125 (H)   08/19/2019 149 (H)     Eats rice, pasta, sometimes salads, meat (steaks)  Doesn't get to to work out or eat as well. Yesenia cut out sugar drinks sweetened ice tea      Review of Systems   Constitutional, HEENT, cardiovascular, pulmonary, gi and gu systems are negative, except as otherwise noted.      Objective    There were no vitals taken for this visit.  There is no height or weight on file to calculate BMI.  Physical Exam   GENERAL: healthy, alert and no distress  CV: regular rate and rhythm, normal S1 S2, no S3 or S4, no murmur, click or rub, no peripheral edema and peripheral pulses strong  MS: no gross musculoskeletal defects noted, no edema  SKIN: no suspicious lesions or rashes  Diabetic foot exam: normal DP and PT pulses, no trophic changes or ulcerative lesions, vibration sensation intact, monofilament exam findings noted on diabetic foot graphical image:  R foot: 2, 3, 4, 6, 7, 9 L foot: 1,2,3, 4,6,7,8,9             Results for orders placed or performed in visit on 03/30/21 (from the past 24 hour(s))   Hemoglobin A1c ([facility])   Result Value Ref Range    Hemoglobin A1C 11.6 (H) 4.1 - 5.7 %

## 2021-03-30 NOTE — PATIENT INSTRUCTIONS
Patient Education   Here is the plan from today's visit    1. Type 2 diabetes mellitus with hyperglycemia, without long-term current use of insulin (H)  Take insulin nightly, use pill box and alarms to remember medication  Check blood sugars in the morning and 2 hours after a meal  - Hemoglobin A1c ([facility])  - Lipid Cascade ([facility])  - Comprehensive Metabolic Panel ([facility])  - Albumin Random Urine Quantitative with Creat Ratio  - AMBULATORY ADULT DIABETES EDUCATOR REFERRAL; Future   If you have not heard from the scheduling office within 2 business days, please call [phone] for [facility] or [phone] for [facility] and [facility].  - Continuous Blood Gluc  (FREESTYLE ANEL 14 DAY READER) DANIKA; 1 each once for 1 dose Use to read blood sugars as per 's instructions.  Dispense: 1 each; Refill: 1  - Continuous Blood Gluc Sensor (FREESTYLE ANEL 14 DAY SENSOR) MISC; 1 each every 14 days Change every 14 days.  Dispense: 2 each; Refill: 11  - Misc. Devices (PILL BOX 7 DAY) MISC; 1 each once a week  Dispense: 1 each; Refill: 1  - insulin glargine (LANTUS PEN) 100 UNIT/ML pen; Inject 15 Units Subcutaneous At Bedtime  Dispense: 3 mL; Refill: 3  - glipiZIDE (GLUCOTROL XL) 10 MG 24 hr tablet; Take 1 tablet (10 mg) by mouth daily  Dispense: 30 tablet; Refill: 3  - metFORMIN (GLUCOPHAGE) 500 MG tablet; Take 2 tablets (1,000 mg) by mouth 2 times daily (with meals)  Dispense: 120 tablet; Refill: 3  - insulin pen needle (31G X 8 MM) 31G X 8 MM miscellaneous; Use 1 pen needles daily or as directed.  Dispense: 100 each; Refill: 1    2. Hyperlipidemia LDL goal <100  - Lipid Solon ([facility])  - atorvastatin (LIPITOR) 40 MG tablet; Not taking 8/18/20 - ask again later  Dispense: 1 tablet; Refill: 0      Please call or return to clinic if your symptoms don't go away.    Follow up plan  Please make a clinic appointment for follow up with your primary physician Nito Dutta MD in 4 weeks  for Diabetes.    Thank you for coming to [facility] today.  Lab Testing:  **If you had lab testing today and your results are reassuring or normal they will be mailed to you or sent through [employer] within 7 days.   **If the lab tests need quick action we will call you with the results.  The phone number we will call with results is # [phone] (home) . If this is not the best number please call our clinic and change the number.  Medication Refills:  If you need any refills please call your pharmacy and they will contact us.   If you need to  your refill at a new pharmacy, please contact the new pharmacy directly. The new pharmacy will help you get your medications transferred faster.   Scheduling:  If you have any concerns about today's visit or wish to schedule another appointment please call our office during normal business hours [phone] (8-5:00 M-F)  If a referral was made to a [facility] and you don't get a call from central scheduling please call [phone].  If a Mammogram was ordered for you at [facility] call [phone] to schedule or change your appointment.  If you had an XRay/CT/Ultrasound/MRI ordered the number is [phone] to schedule or change your radiology appointment.   Medical Concerns:  If you have urgent medical concerns please call [phone] at any time of the day.    Nito Dutta MD

## 2021-04-02 NOTE — PROGRESS NOTES
Preceptor Attestation:  This was a joint visit with patient and Dr Trejo.  I have verified the content of the note, which accurately reflects my assessment of the patient and the plan of care.   Supervising Physician:  Nito Dutta MD.

## 2022-02-16 DIAGNOSIS — E78.5 HYPERLIPIDEMIA LDL GOAL <100: ICD-10-CM

## 2022-02-16 DIAGNOSIS — E11.65 TYPE 2 DIABETES MELLITUS WITH HYPERGLYCEMIA, WITHOUT LONG-TERM CURRENT USE OF INSULIN (H): ICD-10-CM

## 2022-02-16 DIAGNOSIS — Z00.01 ENCOUNTER FOR ROUTINE ADULT MEDICAL EXAM WITH ABNORMAL FINDINGS: ICD-10-CM

## 2022-02-16 NOTE — TELEPHONE ENCOUNTER
"Request for medication refill:    blood glucose monitoring (ACCU-CHEK DARRIN PLUS) meter device kit    Providers if patient needs an appointment and you are willing to give a one month supply please refill for one month and  send a letter/MyChart using \".SMILLIMITEDREFILL\" .smillimited and route chart to \"P SMI \" (Giving one month refill in non controlled medications is strongly recommended before denial)    If refill has been denied, meaning absolutely no refills without visit, please complete the smart phrase \".smirxrefuse\" and route it to the \"P SMI MED REFILLS\"  pool to inform the patient and the pharmacy.    Tess Collins, CMA        "

## 2022-02-18 RX ORDER — BLOOD-GLUCOSE METER
EACH MISCELLANEOUS
Qty: 1 KIT | Refills: 0 | Status: SHIPPED | OUTPATIENT
Start: 2022-02-18 | End: 2023-08-21

## 2023-08-21 ENCOUNTER — OFFICE VISIT (OUTPATIENT)
Dept: FAMILY MEDICINE | Facility: CLINIC | Age: 43
End: 2023-08-21
Payer: COMMERCIAL

## 2023-08-21 VITALS
SYSTOLIC BLOOD PRESSURE: 121 MMHG | BODY MASS INDEX: 29.37 KG/M2 | OXYGEN SATURATION: 96 % | HEART RATE: 65 BPM | RESPIRATION RATE: 18 BRPM | DIASTOLIC BLOOD PRESSURE: 82 MMHG | WEIGHT: 235 LBS

## 2023-08-21 DIAGNOSIS — Z09 HOSPITAL DISCHARGE FOLLOW-UP: ICD-10-CM

## 2023-08-21 DIAGNOSIS — E11.65 TYPE 2 DIABETES MELLITUS WITH HYPERGLYCEMIA, WITHOUT LONG-TERM CURRENT USE OF INSULIN (H): Primary | ICD-10-CM

## 2023-08-21 LAB
ANION GAP SERPL CALCULATED.3IONS-SCNC: 12 MMOL/L (ref 7–15)
BUN SERPL-MCNC: 47.2 MG/DL (ref 6–20)
CALCIUM SERPL-MCNC: 9.1 MG/DL (ref 8.6–10)
CHLORIDE SERPL-SCNC: 99 MMOL/L (ref 98–107)
CREAT SERPL-MCNC: 5.64 MG/DL (ref 0.67–1.17)
CREAT UR-MCNC: 117 MG/DL
DEPRECATED HCO3 PLAS-SCNC: 29 MMOL/L (ref 22–29)
GFR SERPL CREATININE-BSD FRML MDRD: 12 ML/MIN/1.73M2
GLUCOSE SERPL-MCNC: 138 MG/DL (ref 70–99)
HBA1C MFR BLD: 10.7 % (ref 0–5.6)
MICROALBUMIN UR-MCNC: 30.8 MG/L
MICROALBUMIN/CREAT UR: 26.32 MG/G CR (ref 0–17)
POTASSIUM SERPL-SCNC: 4 MMOL/L (ref 3.4–5.3)
SODIUM SERPL-SCNC: 140 MMOL/L (ref 136–145)

## 2023-08-21 PROCEDURE — 82570 ASSAY OF URINE CREATININE: CPT

## 2023-08-21 PROCEDURE — 99214 OFFICE O/P EST MOD 30 MIN: CPT | Mod: GC

## 2023-08-21 PROCEDURE — 82043 UR ALBUMIN QUANTITATIVE: CPT

## 2023-08-21 PROCEDURE — 36415 COLL VENOUS BLD VENIPUNCTURE: CPT

## 2023-08-21 PROCEDURE — 80048 BASIC METABOLIC PNL TOTAL CA: CPT

## 2023-08-21 PROCEDURE — 83036 HEMOGLOBIN GLYCOSYLATED A1C: CPT

## 2023-08-21 RX ORDER — BISMUTH SUBSALICYLATE 262MG/15ML
SUSPENSION, ORAL (FINAL DOSE FORM) ORAL
Qty: 100 EACH | Refills: 0 | Status: SHIPPED | OUTPATIENT
Start: 2023-08-21

## 2023-08-21 RX ORDER — BLOOD-GLUCOSE METER
EACH MISCELLANEOUS
Qty: 1 KIT | Refills: 0 | Status: CANCELLED | OUTPATIENT
Start: 2023-08-21

## 2023-08-21 RX ORDER — AMLODIPINE BESYLATE 5 MG/1
5 TABLET ORAL DAILY
COMMUNITY
End: 2023-08-21

## 2023-08-21 RX ORDER — GLIPIZIDE 10 MG/1
10 TABLET, FILM COATED, EXTENDED RELEASE ORAL DAILY
Qty: 30 TABLET | Refills: 3 | Status: SHIPPED | OUTPATIENT
Start: 2023-08-21 | End: 2023-08-28

## 2023-08-21 RX ORDER — CARVEDILOL 6.25 MG/1
6.25 TABLET ORAL 2 TIMES DAILY WITH MEALS
COMMUNITY
End: 2023-08-21

## 2023-08-21 RX ORDER — SUCRALFATE 1 G/1
1 TABLET ORAL DAILY
COMMUNITY
End: 2023-08-21

## 2023-08-21 NOTE — PROGRESS NOTES
Assessment & Plan     Type 2 diabetes mellitus with hyperglycemia, without long-term current use of insulin (H)  Last office visit was on 3/30/2021, A1c 11.6. Patient was recently hospitalized (see below) and recorded to have a A1c of 12.9. He has been non-complaint with his diabetes medication. Sister accompany him today to assist him is his healthcare management. He has not taken any medication for the past three years. Denies any symptoms of increase thirst or dry mouth, urinary frequency, or altered mental status. Although, mention occasional blurred vision. Discuss with the Erika about long term effect of diabetes if he continues to non adhere to his medication as well his needs to stay consistent with his office visit. Will repeat labs and provide refills today. Discuss with Tony about current Lantus regimen, will increase to 20 units at bedtime. Referral for diabetes education and eye evaluation given. Pt will keep appointment on 8/28 for follow up.  - Hemoglobin A1c   - Basic metabolic panel  - Albumin Random Urine Quantitative with Creat Ratio  - Basic metabolic panel  - insulin pen needle (31G X 8 MM) 31G X 8 MM miscellaneous  Dispense: 100 each; Refill: 1  - blood glucose (COURTNEY CONTOUR NEXT) test strip  Dispense: 100 strip; Refill: 12  - blood glucose monitoring (NO BRAND SPECIFIED) meter device kit  Dispense: 1 kit; Refill: 0  - blood glucose monitoring (ULTRA THIN 30G) lancets  Dispense: 100 each; Refill: 0  - insulin glargine (LANTUS PEN) 100 UNIT/ML pen  Dispense: 3 mL; Refill: 3  - glipiZIDE (GLUCOTROL XL) 10 MG 24 hr tablet  Dispense: 30 tablet; Refill: 3  - metFORMIN (GLUCOPHAGE) 500 MG tablet  Dispense: 120 tablet; Refill: 3  - Adult Eye  Referral  - [facility] Adult Diabetes Educator Referral    [facility] dis/charge follow-up   Presented to [facility] ED after non resolving cut along left plantar foot, initial presentation concerning for severe sepsis and was started on vanco/zosyn/IV  fluids, Podiatry and ID consulted. MRI showed Osteomyelitis of the 5th metatarsal head. I&D with extensive infection debrided was completed, stay was complicate by ARF (creatinine 0.8-10) due to combination of antibiotics (vanco/zosyn; random vanco level quite high, discontinued 8/04) with possible contribution of underlying infection and lonstanding, poorly controlled diabetes mellitus. Erika transfer care to [facility]. Lasix, Phoslo, and bicitra initiated. Cr continued to downtrend during hospital stay. Orthopedic ID and Nephrology following. Discuss with patient and sister the importance of following up with his providers. He has upcoming appointment with Ortho and Nephrology on 8/23 at [place]. Pt has planned to follow up with us after these upcoming visit with his specialist.        Nicotine/Tobacco Cessation:  He reports that he has been smoking hookah. He has never used smokeless tobacco.  Nicotine/Tobacco Cessation Plan:   Information offered: Patient not interested at this time      Return in about 1 week (around 8/28/2023) for Follow up.    David Valerio MD  [facility] ALVAREZ James is a 43 year old, presenting for the following health issues:  Diabetes (Pt here for diabetes follow up)        8/21/2023     3:25 PM   Additional Questions   Roomed by Doua   Accompanied by Sister         8/21/2023     3:25 PM   Patient Reported Additional Medications   Patient reports taking the following new medications n/a       [facility]       Hospital Follow-up Visit:    Hospital/Nursing Home/IP Rehab Facility:  [facility]   Date of Admission: [facility]: 08/01/23  [facility]: 8/10/23  Date of Discharge: [facility]: 08/09/23  [facility]: 8/19/23  Reason(s) for Admission: [facility]: Osteomyelitis    [facility]: Acute renal failure     Was your hospitalization related to COVID-19? No   Problems taking medications regularly:  None  Medication changes since  discharge: started Amlopine 5 mg daily, carvedilol 6.25 mg BID, and Phoslo TID daily with meals  Problems adhering to non-medication therapy:  None    Summary of hospitalization:  [facility] discharge summary reviewed    43M who presented from OSH for osteomyelitis s/p I&D and 5th metatarsal resection. Hospital course c/b acute renal failure. Comorbidities include uncontrolled type 2 diabetes (A1c 12.9), not on treatment.    [place] (08/01-08/09)  Admitted 08/01 for osteomyelitis of left foot. A1c 12.9%. Started on vanc/zosyn. MRI showed shallow osteomyelitis of 5th metatarsal head of left foot, with evidence of gas gangrene. Patient underwent I&D with 5th metatarsal bone resection, cultures positive for group B strep, strep mitis, strep anginosus, MSSA. Stay complicated by acute renal failure, baseline creatinine 0.8 to 10 over a few days. Vancomycin stopped 08/04, vancomycin level at that time 61 (unclear if trough), antibiotics transitioned to cefazolin. UA with mild proteinuria and few WBCs. Additional I&D and resection 08/08. Given renal failure, nephrology recommended dialysis. However, patient and family were unsatisfied with care and left AMA 08/09.    [facility] ED (08/10)   On presentation to [facility] ED the following day, he was afebrile and hemodynamically stable, creatinine of 11.3, alert and oriented w/o signs of uremia. He was admitted for management of acute renal failure.     [facility] (08/10-08/19)  Initially his creatinine continued to trend upward towards a peak of 14.13 on 08/14. Repeat UA w/ mild proteinuria and few WBCs. Continued to produce urine with Lasix boluses without indications for dialysis, following PhosLo and Bicitra initiation. Renal biopsy was considered, but deferred after Cr began to downtrend. Predominant symptom throughout hospitalization was nausea, vomiting, and epigastric pain which improved with antiemetics and GI cocktail. H2 blocker and PPI  were deferred d/t risk of AIN. Creatinine on 08/18 was 10.8. Suspect etiology of renal failure felt to be sepsis associated ATN vs AIN in the setting of vanc and/or zosyn vs vanc toxicity.     Diagnostic Tests/Treatments reviewed.  Follow up needed: none  Other Healthcare Providers Involved in Patient s Care:    8/23/2023-Cecy Stein M.D., Ph.D. Nephrology and Hypertension   8/29/2023 -Richard Linder III, M.D. Orthopedic Surgery     Update since discharge: improved.         Plan of care communicated with patient and sister. Continue to follow up with Orthopedic and Nephrology this week.            Diabetes Follow-up    How often are you checking your blood sugar? Not at all  What concerns do you have today about your diabetes? None and Other: Has not taken diabetes medication in 3 years. Would like to restart medications    Do you have any of these symptoms? (Select all that apply)  Blurry vision and No numbness or tingling in feet.  No redness, sores or blisters on feet.  No complaints of excessive thirst.  No reports of blurry vision.  No significant changes to weight.  Have you had a diabetic eye exam in the last 12 months? No        BP Readings from Last 2 Encounters:   08/21/23 121/82   03/30/21 114/79     Hemoglobin A1C (%)   Date Value   08/21/2023 10.7 (H)   03/30/2021 11.6 (H)   08/18/2020 11.5 (H)     LDL Cholesterol Calculated (mg/dL)   Date Value   03/30/2021 117   08/18/2020 125 (H)             Review of Systems   Constitutional, HEENT, cardiovascular, pulmonary, gi and gu systems are negative, except as otherwise noted.      Objective    /82 (BP Location: Left arm, Patient Position: Sitting, Cuff Size: Adult Large)   Pulse 65   Resp 18   Wt 106.6 kg (235 lb)   SpO2 96%   BMI 29.37 kg/m    Body mass index is 29.37 kg/m .    Physical Exam   GENERAL: healthy, alert and no distress  EYES: Eyes grossly normal to inspection, PERRL and conjunctivae and sclerae normal  HENT: ear canals and TM's  normal, nose and mouth without ulcers or lesions  NECK: no adenopathy, no asymmetry, masses, or scars and thyroid normal to palpation  RESP: lungs clear to auscultation - no rales, rhonchi or wheezes  CV: regular rate and rhythm, normal S1 S2, no S3 or S4, no murmur, click or rub, no peripheral edema and peripheral pulses strong  ABDOMEN: soft, nontender, no hepatosplenomegaly, no masses and bowel sounds normal  MS: no gross musculoskeletal defects noted, no edema  SKIN: no suspicious lesions or rashes  NEURO: Normal strength and tone, mentation intact and speech normal  PSYCH: mentation appears normal, affect normal/bright  Diabetic foot exam: RT foot: normal DP and PT pulses, no trophic changes or ulcerative lesions, and normal sensory exam. Unable to perform LT foot exam due to it being wrapped and in [place] boot.    Results for orders placed or performed in visit on 08/21/23 (from the past 24 hour(s))   Hemoglobin A1c   Result Value Ref Range    Hemoglobin A1C 10.7 (H) 0.0 - 5.6 %   Basic metabolic panel   Result Value Ref Range    Sodium 140 136 - 145 mmol/L    Potassium 4.0 3.4 - 5.3 mmol/L    Chloride 99 98 - 107 mmol/L    Carbon Dioxide (CO2) 29 22 - 29 mmol/L    Anion Gap 12 7 - 15 mmol/L    Urea Nitrogen 47.2 (H) 6.0 - 20.0 mg/dL    Creatinine 5.64 (H) 0.67 - 1.17 mg/dL    Calcium 9.1 8.6 - 10.0 mg/dL    Glucose 138 (H) 70 - 99 mg/dL    GFR Estimate 12 (L) >60 mL/min/1.73m2   Albumin Random Urine Quantitative with Creat Ratio   Result Value Ref Range    Creatinine Urine mg/dL 117.0 mg/dL    Albumin Urine mg/L 30.8 mg/L    Albumin Urine mg/g Cr 26.32 (H) 0.00 - 17.00 mg/g Cr       ----- Service Performed and Documented by Resident or Fellow ------

## 2023-08-22 NOTE — PATIENT INSTRUCTIONS
Patient Education   Here is the plan from today's visit    1. Type 2 diabetes mellitus with hyperglycemia, without long-term current use of insulin (H)  We discuss about your A1c today. We will restart your diabetes medications today and change your Lantus from 15 units to 20 units at bedtime.We can discuss how it is going next week. Will inform you of your lab results once available. Diabetes education and eye referral sent.   - Hemoglobin A1c; Future  - Basic metabolic panel; Future  - Albumin Random Urine Quantitative with Creat Ratio; Future  - Hemoglobin A1c  - Basic metabolic panel  - Albumin Random Urine Quantitative with Creat Ratio  - insulin pen needle (31G X 8 MM) 31G X 8 MM miscellaneous; Use 1 pen needles daily or as directed.  Dispense: 100 each; Refill: 1  - blood glucose (COURTNEY CONTOUR NEXT) test strip; Use to test blood sugar 1-3 times daily or as directed.  Dispense: 100 strip; Refill: 12  - blood glucose monitoring (NO BRAND SPECIFIED) meter device kit; Use to test blood sugar 2-3 times daily or as directed.  Dispense: 1 kit; Refill: 0  - blood glucose monitoring (ULTRA THIN 30G) lancets; Use to test blood sugar 1-3 times daily or as directed.  Dispense: 100 each; Refill: 0  - insulin glargine (LANTUS PEN) 100 UNIT/ML pen; Inject 20 Units Subcutaneous At Bedtime  Dispense: 3 mL; Refill: 3  - glipiZIDE (GLUCOTROL XL) 10 MG 24 hr tablet; Take 1 tablet (10 mg) by mouth daily  Dispense: 30 tablet; Refill: 3  - metFORMIN (GLUCOPHAGE) 500 MG tablet; Take 2 tablets (1,000 mg) by mouth 2 times daily (with meals)  Dispense: 120 tablet; Refill: 3  - Adult Eye  Referral; Future  - [facility] Adult Diabetes Educator Referral; Future    2. Hospital discharge follow-up  Please follow up with Orthopedic and Nephrology this week. We can discuss these visit at your next follow up appointment.       Please call or return to clinic if your symptoms don't go away.    Follow up plan  Return in about 1 week (around  8/28/2023) for Follow up.    Thank you for coming to [facility] today.  Lab Testing:  **If you had lab testing today and your results are reassuring or normal they will be mailed to you or sent through [employer] within 7 days.   **If the lab tests need quick action we will call you with the results.  **If you are having labs done on a different day, please call [phone] to schedule at [facility] or [phone] for other [facility] locations. Labs do not offer walk-in appointments.  The phone number we will call with results is # [phone] (home) . If this is not the best number please call our clinic and change the number.  Medication Refills:  If you need any refills please call your pharmacy and they will contact us.   If you need to  your refill at a new pharmacy, please contact the new pharmacy directly. The new pharmacy will help you get your medications transferred faster.   Scheduling:  If you have any concerns about today's visit or wish to schedule another appointment please call our office during normal business hours [phone] (8-5:00 M-F). If you can no longer make a scheduled visit, please cancel via [employer] or call us to cancel.   If a referral was made to an [facility] specialty provider and you do not get a call from central scheduling, please refer to directions on your visit summary or call our office during normal business hours for assistance.   If a Mammogram was ordered for you at [facility] call [phone] to schedule or change your appointment.  If you had an XRay/CT/Ultrasound/MRI ordered the number is [phone] to schedule or change your radiology appointment.   [facility] has limited ultrasound appointments available on Wednesdays, if you would like your ultrasound at [facility], please call [phone] to schedule.   Medical Concerns:  If you have urgent medical concerns please call [phone] at any time of the  day.    David Valerio MD

## 2023-08-28 ENCOUNTER — OFFICE VISIT (OUTPATIENT)
Dept: FAMILY MEDICINE | Facility: CLINIC | Age: 43
End: 2023-08-28
Payer: COMMERCIAL

## 2023-08-28 VITALS
SYSTOLIC BLOOD PRESSURE: 145 MMHG | OXYGEN SATURATION: 94 % | HEART RATE: 81 BPM | DIASTOLIC BLOOD PRESSURE: 94 MMHG | WEIGHT: 224.4 LBS | BODY MASS INDEX: 28.05 KG/M2

## 2023-08-28 DIAGNOSIS — I10 BENIGN ESSENTIAL HYPERTENSION: ICD-10-CM

## 2023-08-28 DIAGNOSIS — L98.9 SKIN LESION: ICD-10-CM

## 2023-08-28 DIAGNOSIS — N17.9 ACUTE KIDNEY INJURY (H): Primary | ICD-10-CM

## 2023-08-28 DIAGNOSIS — E11.65 TYPE 2 DIABETES MELLITUS WITH HYPERGLYCEMIA, WITHOUT LONG-TERM CURRENT USE OF INSULIN (H): ICD-10-CM

## 2023-08-28 LAB
ALBUMIN SERPL BCG-MCNC: 4 G/DL (ref 3.5–5.2)
ANION GAP SERPL CALCULATED.3IONS-SCNC: 14 MMOL/L (ref 7–15)
BUN SERPL-MCNC: 26.9 MG/DL (ref 6–20)
CALCIUM SERPL-MCNC: 9.3 MG/DL (ref 8.6–10)
CHLORIDE SERPL-SCNC: 105 MMOL/L (ref 98–107)
CREAT SERPL-MCNC: 3.24 MG/DL (ref 0.67–1.17)
DEPRECATED HCO3 PLAS-SCNC: 21 MMOL/L (ref 22–29)
GFR SERPL CREATININE-BSD FRML MDRD: 23 ML/MIN/1.73M2
GLUCOSE SERPL-MCNC: 184 MG/DL (ref 70–99)
PHOSPHATE SERPL-MCNC: 4.1 MG/DL (ref 2.5–4.5)
POTASSIUM SERPL-SCNC: 4.5 MMOL/L (ref 3.4–5.3)
SODIUM SERPL-SCNC: 140 MMOL/L (ref 136–145)

## 2023-08-28 PROCEDURE — 80069 RENAL FUNCTION PANEL: CPT | Performed by: FAMILY MEDICINE

## 2023-08-28 PROCEDURE — 99214 OFFICE O/P EST MOD 30 MIN: CPT | Performed by: FAMILY MEDICINE

## 2023-08-28 PROCEDURE — 36415 COLL VENOUS BLD VENIPUNCTURE: CPT | Performed by: FAMILY MEDICINE

## 2023-08-28 RX ORDER — CARVEDILOL 6.25 MG/1
12.5 TABLET ORAL 2 TIMES DAILY WITH MEALS
COMMUNITY
Start: 2023-08-18 | End: 2023-08-28

## 2023-08-28 RX ORDER — ANTACID TABLETS 500 MG/1
TABLET, CHEWABLE ORAL
COMMUNITY
Start: 2023-08-18 | End: 2023-11-24

## 2023-08-28 RX ORDER — AMLODIPINE BESYLATE 10 MG/1
10 TABLET ORAL DAILY
Qty: 120 TABLET | Refills: 1 | Status: SHIPPED | OUTPATIENT
Start: 2023-08-28 | End: 2024-02-23

## 2023-08-28 RX ORDER — MUPIROCIN 20 MG/G
OINTMENT TOPICAL 3 TIMES DAILY
Qty: 22 G | Refills: 1 | Status: SHIPPED | OUTPATIENT
Start: 2023-08-28 | End: 2023-11-24

## 2023-08-28 RX ORDER — INSULIN GLARGINE 100 [IU]/ML
2 INJECTION, SOLUTION SUBCUTANEOUS EVERY MORNING
Qty: 3 ML | Refills: 3 | Status: SHIPPED | OUTPATIENT
Start: 2023-08-28 | End: 2023-12-22

## 2023-08-28 RX ORDER — CALCIUM ACETATE 667 MG/1
CAPSULE ORAL
COMMUNITY
Start: 2023-08-18 | End: 2023-11-24

## 2023-08-28 RX ORDER — AMLODIPINE BESYLATE 5 MG/1
10 TABLET ORAL DAILY
COMMUNITY
Start: 2023-08-18 | End: 2023-08-28

## 2023-08-28 RX ORDER — CARVEDILOL 12.5 MG/1
12.5 TABLET ORAL 2 TIMES DAILY WITH MEALS
Qty: 120 TABLET | Refills: 1 | Status: SHIPPED | OUTPATIENT
Start: 2023-08-28 | End: 2023-11-24

## 2023-08-28 NOTE — PATIENT INSTRUCTIONS
Patient Education   Here is the plan from today's visit    1. CKD (chronic kidney disease) stage 5, GFR less than 15 ml/min (H)  Check [facility]  - Renal panel; Future    2. Type 2 diabetes mellitus with hyperglycemia, without long-term current use of insulin (H)  2 unites  - insulin glargine (LANTUS SOLOSTAR) 100 UNIT/ML pen; Inject 2 Units Subcutaneous every morning  Dispense: 3 mL; Refill: 3    3. Skin lesion  Place on scalp  - mupirocin (BACTROBAN) 2 % external ointment; Apply topically 3 times daily  Dispense: 22 g; Refill: 1    4. Benign essential hypertension  This is with the new pills  - carvedilol (COREG) 12.5 MG tablet; Take 1 tablet (12.5 mg) by mouth 2 times daily (with meals)  Dispense: 120 tablet; Refill: 1  - amLODIPine (NORVASC) 10 MG tablet; Take 1 tablet (10 mg) by mouth daily  Dispense: 120 tablet; Refill: 1          Please call or return to clinic if your symptoms don't go away.    Follow up plan  No follow-ups on file.    Thank you for coming to [facility] today.  Lab Testing:  **If you had lab testing today and your results are reassuring or normal they will be mailed to you or sent through [employer] within 7 days.   **If the lab tests need quick action we will call you with the results.  **If you are having labs done on a different day, please call [phone] to schedule at [facility] or [phone] for other [facility] locations. Labs do not offer walk-in appointments.  The phone number we will call with results is # [phone] (home) . If this is not the best number please call our clinic and change the number.  Medication Refills:  If you need any refills please call your pharmacy and they will contact us.   If you need to  your refill at a new pharmacy, please contact the new pharmacy directly. The new pharmacy will help you get your medications transferred faster.   Scheduling:  If you have any concerns about today's visit or wish to schedule another  appointment please call our office during normal business hours [phone] (8-5:00 M-F). If you can no longer make a scheduled visit, please cancel via [employer] or call us to cancel.   If a referral was made to an [facility] specialty provider and you do not get a call from central scheduling, please refer to directions on your visit summary or call our office during normal business hours for assistance.   If a Mammogram was ordered for you at [facility] call [phone] to schedule or change your appointment.  If you had an XRay/CT/Ultrasound/MRI ordered the number is [phone] to schedule or change your radiology appointment.   [facility] has limited ultrasound appointments available on Wednesdays, if you would like your ultrasound at [facility], please call [phone] to schedule.   Medical Concerns:  If you have urgent medical concerns please call [phone] at any time of the day.    Da Hirsch MD    Check

## 2023-08-28 NOTE — PROGRESS NOTES
Assessment & Plan     CKD (chronic kidney disease) stage 5, GFR less than 15 ml/min (H)  Will recheck BMP today  - Renal panel; Future  - Renal panel    Type 2 diabetes mellitus with hyperglycemia, without long-term current use of insulin (H)  Uncontrolled,  until renal function recovers will use insulin to manage BGs   Will start slow, advised testing FBG and 2 hour postprandial.   - insulin glargine (LANTUS SOLOSTAR) 100 UNIT/ML pen; Inject 2 Units Subcutaneous every morning    Skin lesion  Likely MRSA   - mupirocin (BACTROBAN) 2 % external ointment; Apply topically 3 times daily    Benign essential hypertension  Uncontrolled will increase both oreg and amlodipine  - carvedilol (COREG) 12.5 MG tablet; Take 1 tablet (12.5 mg) by mouth 2 times daily (with meals)  - amLODIPine (NORVASC) 10 MG tablet; Take 1 tablet (10 mg) by mouth daily      I spent a total of 37 minutes on the day of the visit.   Time spent by me doing chart review, history and exam, documentation and further activities per the note           Return for Next Friday kevin an Appt .    Da Hirsch MD  [facility] ALVAREZ James is a 43 year old, presenting for the following health issues:  Follow Up (HOSP. FOR FLU.)      8/28/2023     1:37 PM   Additional Questions   Roomed by Lilo   Accompanied by self       HPI   DM  Was seen last week in clinic and prescribed metformin, insulin and glipizide.   #1 Failure Renal Acute (Acute Kidney Injury-likely for HTN DM and Vanco ) (HCC) had an episode of hypoglycemia. Was told to stop all medications.   From nephrologist   In summary this is a 43-year-old man with a history of type 2 diabetes mellitus and hypertension who developed ITZ in the setting of osteomyelitis and multiple antibiotics given during treatment. He is continuing to show clear evidence of renal recovery which is an excellent finding.    His blood pressure is not well-controlled today and therefore I  have asked him to increase his amlodipine either to 5 mg b.i.d. or 10 mg daily. Ultimately he would benefit from either being on an ACE inhibitor or angiotensin receptor blocker given the history of diabetes. However we will wait to initiate these drugs until we know that he is achieved his new baseline renal function.    Obviously with renal failure he should not be on metformin currently. I have asked that he hold this medication until his renal function recovers.    Because he had what sounds like an episode of severe hypoglycemia with insulin administration yesterday, I have asked that he monitor his blood sugars on glipizide alone for the next few days and share this information with his PCP whom he intends to see on 08/28/2023.    The PCP can then decide the ultimate strategy for initiation of insulin if necessary. It should be noted that he would likely benefit from being on an SGLT2 inhibitor, or a GLP agonist and therefore may not need insulin long-term.    I have asked that he follow-up with us in 2 months. Thank you for involving us in his care.    Cecy Stein M.D., Ph.D.    Took medications and BG went to 74 and he was symptomatic.  Has stopped all DM medications  and his highest BG is 184. FBG has 110-128, post prandial.  Lab Results   Component Value Date    A1C 10.7 08/21/2023    A1C 11.6 03/30/2021    A1C 11.5 08/18/2020    A1C 11.6 08/19/2019    A1C 10.8 08/20/2018    A1C 9.3 12/28/2016     GFR Estimate   Date Value Ref Range Status   08/21/2023 12 (L) >60 mL/min/1.73m2 Final   03/30/2021 >90 >60.0 mL/min/1.7 m2 Final   08/18/2020 >90 >60.0 mL/min/1.7 m2 Final   08/19/2019 >90 >60.0 mL/min/1.7 m2 Final   08/20/2018 >90 >60 mL/min/1.7m2 Final     Comment:     Non  GFR Calc   12/28/2016 162.0 mL/min/1.7 m2 Final     GFR Estimate If Black   Date Value Ref Range Status   03/30/2021 >90 >60.0 mL/min/1.7 m2 Final   08/18/2020 >90 >60.0 mL/min/1.7 m2 Final   08/19/2019 >90 >60.0 mL/min/1.7  m2 Final   08/20/2018 >90 >60 mL/min/1.7m2 Final     Comment:      GFR Calc   12/28/2016 196.1 mL/min/1.7 m2 Final                Review of Systems         Objective    BP (!) 145/94 (BP Location: Left arm, Patient Position: Sitting, Cuff Size: Adult Regular)   Pulse 81   Wt 101.8 kg (224 lb 6.4 oz)   SpO2 94%   BMI 28.05 kg/m    Body mass index is 28.05 kg/m .  Physical Exam  Vitals reviewed.   Constitutional:       General: He is not in acute distress.     Appearance: He is well-developed. He is not diaphoretic.   HENT:      Head: Normocephalic.   Eyes:      General: No scleral icterus.     Conjunctiva/sclera: Conjunctivae normal.   Neck:      Thyroid: No thyromegaly.   Cardiovascular:      Rate and Rhythm: Normal rate and regular rhythm.      Heart sounds: Normal heart sounds. No murmur heard.  Pulmonary:      Effort: Pulmonary effort is normal. No respiratory distress.      Breath sounds: Normal breath sounds. No wheezing.   Musculoskeletal:      Cervical back: Normal range of motion.      Left lower leg: No edema.      Comments: Left foot is in a surgical boot, seeing Ortho tomorrow so was not removed   Lymphadenopathy:      Cervical: No cervical adenopathy.   Skin:     General: Skin is warm and dry.   Neurological:      Mental Status: He is alert and oriented to person, place, and time.   Psychiatric:         Behavior: Behavior normal.         Thought Content: Thought content normal.         Judgment: Judgment normal.

## 2023-09-11 DIAGNOSIS — N17.9 ACUTE KIDNEY INJURY (H): Primary | ICD-10-CM

## 2023-09-12 ENCOUNTER — OFFICE VISIT (OUTPATIENT)
Dept: OPHTHALMOLOGY | Facility: CLINIC | Age: 43
End: 2023-09-12
Attending: STUDENT IN AN ORGANIZED HEALTH CARE EDUCATION/TRAINING PROGRAM
Payer: COMMERCIAL

## 2023-09-12 DIAGNOSIS — H52.13 SEVERE MYOPIA OF BOTH EYES: ICD-10-CM

## 2023-09-12 DIAGNOSIS — Z01.01 ENCOUNTER FOR EXAMINATION OF EYES AND VISION WITH ABNORMAL FINDINGS: Primary | ICD-10-CM

## 2023-09-12 DIAGNOSIS — E11.65 TYPE 2 DIABETES MELLITUS WITH HYPERGLYCEMIA, WITHOUT LONG-TERM CURRENT USE OF INSULIN (H): ICD-10-CM

## 2023-09-12 PROCEDURE — 92004 COMPRE OPH EXAM NEW PT 1/>: CPT | Performed by: OPHTHALMOLOGY

## 2023-09-12 PROCEDURE — 92015 DETERMINE REFRACTIVE STATE: CPT | Performed by: OPHTHALMOLOGY

## 2023-09-12 ASSESSMENT — VISUAL ACUITY
OS_CC: J2
METHOD: SNELLEN - LINEAR
OS_PH_CC: 20/40
OD_CC: 20/30
OS_PH_CC+: -1
OS_CC+: -2
OD_CC: J1
OS_CC: 20/50
OD_CC+: -2
CORRECTION_TYPE: GLASSES

## 2023-09-12 ASSESSMENT — REFRACTION_MANIFEST
OD_AXIS: 097
OD_CYLINDER: +2.25
METHOD_AUTOREFRACTION: 1
OD_SPHERE: -9.25
OS_CYLINDER: +2.50
OD_CYLINDER: +1.25
OD_ADD: +1.25
OD_SPHERE: -9.00
METHOD_AUTOREFRACTION: 1
OS_ADD: +1.25
OS_AXIS: 073
OD_CYLINDER: +1.25
OD_AXIS: 092
OS_CYLINDER: +2.00
OS_SPHERE: -13.50
OD_AXIS: 098
OD_SPHERE: -9.25
OS_SPHERE: -15.00
OS_SPHERE: -15.00
OS_AXIS: 064
OS_CYLINDER: +1.25
OS_AXIS: 073

## 2023-09-12 ASSESSMENT — CONF VISUAL FIELD
OD_NORMAL: 1
OS_INFERIOR_NASAL_RESTRICTION: 0
OD_SUPERIOR_TEMPORAL_RESTRICTION: 0
OS_SUPERIOR_NASAL_RESTRICTION: 0
OS_SUPERIOR_TEMPORAL_RESTRICTION: 0
OS_NORMAL: 1
OD_SUPERIOR_NASAL_RESTRICTION: 0
OS_INFERIOR_TEMPORAL_RESTRICTION: 0
OD_INFERIOR_TEMPORAL_RESTRICTION: 0
OD_INFERIOR_NASAL_RESTRICTION: 0

## 2023-09-12 ASSESSMENT — CUP TO DISC RATIO
OS_RATIO: 0.6
OD_RATIO: 0.5

## 2023-09-12 ASSESSMENT — KERATOMETRY
OS_K2POWER_DIOPTERS: 43.50
OS_K1POWER_DIOPTERS: 41.75
OS_AXISANGLE_DEGREES: 085
OD_K1POWER_DIOPTERS: 41.75
OD_AXISANGLE_DEGREES: 090
METHOD_AUTO_MANUAL: AUTO
OD_K2POWER_DIOPTERS: 44.00

## 2023-09-12 ASSESSMENT — REFRACTION_WEARINGRX
OS_CYLINDER: +2.75
OS_SPHERE: -12.75
OD_CYLINDER: +2.00
OS_AXIS: 058
SPECS_TYPE: SVL
OD_AXIS: 101
OD_SPHERE: -8.75

## 2023-09-12 ASSESSMENT — TONOMETRY
OS_IOP_MMHG: 09
IOP_METHOD: APPLANATION
OD_IOP_MMHG: 07

## 2023-09-12 ASSESSMENT — EXTERNAL EXAM - RIGHT EYE: OD_EXAM: NORMAL

## 2023-09-12 ASSESSMENT — SLIT LAMP EXAM - LIDS
COMMENTS: NORMAL
COMMENTS: NORMAL

## 2023-09-12 ASSESSMENT — EXTERNAL EXAM - LEFT EYE: OS_EXAM: NORMAL

## 2023-09-12 NOTE — PATIENT INSTRUCTIONS
"Glasses prescription given - optional    May use artificial tears up to four times a day (like Refresh Optive, Systane Balance, TheraTears, or generic artificial tears are ok. Avoid \"get the red out\" drops).     Call in May 2024 for an appointment in September 2024 for Complete Exam    Dr. Canchola [phone]      Patient Education   Diabetes weakens the blood vessels all over the body, including the eyes. Damage to the blood vessels in the eyes can cause swelling or bleeding into part of the eye (called the retina). This is called diabetic retinopathy (KATELYN-tin--puh-thee). If not treated, this disease can cause vision loss or blindness.   Symptoms may include blurred or distorted vision, but many people have no symptoms. It's important to see your eye doctor regularly to check for problems.   Early treatment and good control can help protect your vision. Here are the things you can do to help prevent vision loss:      1. Keep your blood sugar levels under tight control.      2. Bring high blood pressure under control.      3. No smoking.      4. Have yearly dilated eye exams.     "

## 2023-09-12 NOTE — LETTER
"    9/12/2023         RE: Erika Padilla  [address]  [place] [address]        Dear Colleague,    Thank you for referring your patient, Erika Padilla, to the [facility]. Please see a copy of my visit note below.     Current Eye Medications:  none.      Subjective:  Patient is here for a Diabetic Eye Exam.   Patient complains of some decreasing near vision.  Distance vision appears to be good with correction.    Last eye exam:  3-4 years ago.    No history of eye injuries or surgery.  No family history of glaucoma.     Lab Results   Component Value Date    A1C 10.7 08/21/2023    A1C 11.6 03/30/2021    A1C 11.5 08/18/2020    A1C 11.6 08/19/2019    A1C 10.8 08/20/2018    A1C 9.3 12/28/2016        Objective:  See Ophthalmology Exam.       Assessment:  Baseline eye exam in patient with diabetes and high myopia.  No diabetic retinopathy.      ICD-10-CM    1. Encounter for examination of eyes and vision with abnormal findings  Z01.01       2. Severe myopia of both eyes  H52.13       3. Type 2 diabetes mellitus with hyperglycemia, without long-term current use of insulin (H)  E11.65 Adult Eye  Referral      ]     Plan:  Glasses prescription given - optional    May use artificial tears up to four times a day (like Refresh Optive, Systane Balance, TheraTears, or generic artificial tears are ok. Avoid \"get the red out\" drops).     Encouraged to discuss smoking cessation with PCP.    Call in May 2024 for an appointment in September 2024 for Complete Exam    Dr. Canchola [phone]           Again, thank you for allowing me to participate in the care of your patient.        Sincerely,        Abilio Canchola MD  "

## 2023-09-12 NOTE — PROGRESS NOTES
" Current Eye Medications:  none.      Subjective:  Patient is here for a Diabetic Eye Exam.   Patient complains of some decreasing near vision.  Distance vision appears to be good with correction.    Last eye exam:  3-4 years ago.    No history of eye injuries or surgery.  No family history of glaucoma.     Lab Results   Component Value Date    A1C 10.7 08/21/2023    A1C 11.6 03/30/2021    A1C 11.5 08/18/2020    A1C 11.6 08/19/2019    A1C 10.8 08/20/2018    A1C 9.3 12/28/2016        Objective:  See Ophthalmology Exam.       Assessment:  Baseline eye exam in patient with diabetes and high myopia.  No diabetic retinopathy.      ICD-10-CM    1. Encounter for examination of eyes and vision with abnormal findings  Z01.01       2. Severe myopia of both eyes  H52.13       3. Type 2 diabetes mellitus with hyperglycemia, without long-term current use of insulin (H)  E11.65 Adult Eye  Referral      ]     Plan:  Glasses prescription given - optional    May use artificial tears up to four times a day (like Refresh Optive, Systane Balance, TheraTears, or generic artificial tears are ok. Avoid \"get the red out\" drops).     Encouraged to discuss smoking cessation with PCP.    Call in May 2024 for an appointment in September 2024 for Complete Exam    Dr. Canchola [phone]         "

## 2023-09-13 PROBLEM — H52.13 SEVERE MYOPIA OF BOTH EYES: Status: ACTIVE | Noted: 2023-09-13

## 2023-09-30 ENCOUNTER — HEALTH MAINTENANCE LETTER (OUTPATIENT)
Age: 43
End: 2023-09-30

## 2023-11-24 ENCOUNTER — OFFICE VISIT (OUTPATIENT)
Dept: FAMILY MEDICINE | Facility: CLINIC | Age: 43
End: 2023-11-24
Payer: COMMERCIAL

## 2023-11-24 ENCOUNTER — CARE COORDINATION (OUTPATIENT)
Dept: FAMILY MEDICINE | Facility: CLINIC | Age: 43
End: 2023-11-24

## 2023-11-24 VITALS
BODY MASS INDEX: 28.44 KG/M2 | WEIGHT: 227.5 LBS | OXYGEN SATURATION: 100 % | DIASTOLIC BLOOD PRESSURE: 72 MMHG | HEART RATE: 76 BPM | SYSTOLIC BLOOD PRESSURE: 107 MMHG

## 2023-11-24 DIAGNOSIS — N17.9 ACUTE KIDNEY INJURY (H): Primary | ICD-10-CM

## 2023-11-24 DIAGNOSIS — E11.65 TYPE 2 DIABETES MELLITUS WITH HYPERGLYCEMIA, WITHOUT LONG-TERM CURRENT USE OF INSULIN (H): ICD-10-CM

## 2023-11-24 LAB
ANION GAP SERPL CALCULATED.3IONS-SCNC: 13 MMOL/L (ref 3–14)
BUN SERPL-MCNC: 18 MG/DL (ref 7–30)
CALCIUM SERPL-MCNC: 9.4 MG/DL (ref 8.5–10.1)
CHLORIDE BLD-SCNC: 103 MMOL/L (ref 94–109)
CHOLEST SERPL-MCNC: 218 MG/DL
CO2 SERPL-SCNC: 27 MMOL/L (ref 20–32)
CREAT SERPL-MCNC: 1.5 MG/DL (ref 0.66–1.25)
EGFRCR SERPLBLD CKD-EPI 2021: 59 ML/MIN/1.73M2
GLUCOSE BLD-MCNC: 240 MG/DL (ref 70–99)
HDLC SERPL-MCNC: 37 MG/DL
LDLC SERPL CALC-MCNC: 145 MG/DL
NONHDLC SERPL-MCNC: 181 MG/DL
POTASSIUM BLD-SCNC: 4 MMOL/L (ref 3.4–5.3)
SODIUM SERPL-SCNC: 143 MMOL/L (ref 133–144)
TRIGL SERPL-MCNC: 179 MG/DL

## 2023-11-24 PROCEDURE — 80048 BASIC METABOLIC PNL TOTAL CA: CPT | Performed by: FAMILY MEDICINE

## 2023-11-24 PROCEDURE — 99214 OFFICE O/P EST MOD 30 MIN: CPT | Performed by: FAMILY MEDICINE

## 2023-11-24 PROCEDURE — 36415 COLL VENOUS BLD VENIPUNCTURE: CPT | Performed by: FAMILY MEDICINE

## 2023-11-24 PROCEDURE — 80061 LIPID PANEL: CPT | Performed by: FAMILY MEDICINE

## 2023-11-24 RX ORDER — LOSARTAN POTASSIUM 25 MG/1
25 TABLET ORAL DAILY
Qty: 90 TABLET | Refills: 1 | Status: SHIPPED | OUTPATIENT
Start: 2023-11-24

## 2023-11-24 RX ORDER — METFORMIN HCL 500 MG
TABLET, EXTENDED RELEASE 24 HR ORAL
Qty: 360 TABLET | Refills: 0 | Status: SHIPPED | OUTPATIENT
Start: 2023-11-24 | End: 2023-12-22

## 2023-11-24 NOTE — PATIENT INSTRUCTIONS
Patient Education   Here is the plan from today's visit    1. Acute kidney injury (H24)  improved  - Basic metabolic panel; Future  - Basic metabolic panel  - canagliflozin (INVOKANA) 100 MG tablet; Take 1 tablet (100 mg) by mouth every morning (before breakfast)  Dispense: 30 tablet; Refill: 0  - metFORMIN (GLUCOPHAGE XR) 500 MG 24 hr tablet; Take 1 tablet (500 mg) by mouth daily for 7 days, THEN 2 tablets (1,000 mg) daily for 7 days, THEN 3 tablets (1,500 mg) daily for 7 days, THEN 4 tablets (2,000 mg) daily for 69 days.  Dispense: 360 tablet; Refill: 0    2. Type 2 diabetes mellitus with hyperglycemia, without long-term current use of insulin (H)  Start this medication, stop carvedilol  - losartan (COZAAR) 25 MG tablet; Take 1 tablet (25 mg) by mouth daily  Dispense: 90 tablet; Refill: 1  - Lipid panel reflex to direct LDL Non-fasting; Future          Please call or return to clinic if your symptoms don't go away.    Follow up plan  Return in about 4 weeks (around 12/22/2023) for Diabetes BG recheck.    Thank you for coming to [facility] today.  Lab Testing:  **If you had lab testing today and your results are reassuring or normal they will be mailed to you or sent through [employer] within 7 days.   **If the lab tests need quick action we will call you with the results.  **If you are having labs done on a different day, please call [phone] to schedule at [facility] or [phone] for other [facility] locations. Labs do not offer walk-in appointments.  The phone number we will call with results is # [phone] (home) . If this is not the best number please call our clinic and change the number.  Medication Refills:  If you need any refills please call your pharmacy and they will contact us.   If you need to  your refill at a new pharmacy, please contact the new pharmacy directly. The new pharmacy will help you get your medications transferred faster.   Scheduling:  If you  have any concerns about today's visit or wish to schedule another appointment please call our office during normal business hours [phone] (8-5:00 M-F). If you can no longer make a scheduled visit, please cancel via Bunkr or call us to cancel.   If a referral was made to an [facility] specialty provider and you do not get a call from central scheduling, please refer to directions on your visit summary or call our office during normal business hours for assistance.   If a Mammogram was ordered for you at the [facility] call [phone] to schedule or change your appointment.  If you had an XRay/CT/Ultrasound/MRI ordered the number is [phone] to schedule or change your radiology appointment.   [facility] has limited ultrasound appointments available on Wednesdays, if you would like your ultrasound at [facility], please call [phone] to schedule.   Medical Concerns:  If you have urgent medical concerns please call [phone] at any time of the day.    Da Hirsch MD

## 2023-11-24 NOTE — PROGRESS NOTES
met with patient per PCP request.    Patient reported to SW that he is interested in finding his own apartment, as he is currently living with his sister and her family. Patient stated he wanted SW to open a section 8 voucher for him. SW explain to patient that to apply for the [place] Section 8 Housing Choice Voucher program, patients need to contact his local housing authority who administers Section 8 grants. . SW explained that patient can only apply to a Section 8 waiting list if it is open. SW also explained to patient that to qualify for a Section 8 voucher, he must fall within [place]'s income limits. Patient expressed understanding.    SW provided patient with contact information and address for his local housing authority and encouraged patient to call when he is able to discuss to programs in detail. Patient was in agreement with this plan.    SW also showed patient the Housing Link website and how he is able to search for an apartment by location, costs, size, etc. SW encouraged patient to also look for apartments independently due to long wait times for section 8 programs. Patient expressed his understanding.     Patient was provided with [facility]'s contact card and encouraged to call with any questions.    SHRAVAN Varela

## 2023-11-24 NOTE — PROGRESS NOTES
Assessment & Plan     Acute kidney injury (H24)  Patient has a history of acute kidney injury from vancomycin and has had partial recovery and now GFR is 59.  This is good enough to restart his diabetes medicine and to add Invokana for renal protection and to add losartan for renal protection as well asked patient to follow-up in 4 weeks to reassess kidney function  - Basic metabolic panel; Future  - Basic metabolic panel  - canagliflozin (INVOKANA) 100 MG tablet; Take 1 tablet (100 mg) by mouth every morning (before breakfast)  - metFORMIN (GLUCOPHAGE XR) 500 MG 24 hr tablet; Take 1 tablet (500 mg) by mouth daily for 7 days, THEN 2 tablets (1,000 mg) daily for 7 days, THEN 3 tablets (1,500 mg) daily for 7 days, THEN 4 tablets (2,000 mg) daily for 69 days.    Type 2 diabetes mellitus with hyperglycemia, without long-term current use of insulin (H)  Renal function and diabetic function.  - losartan (COZAAR) 25 MG tablet; Take 1 tablet (25 mg) by mouth daily  - Lipid panel reflex to direct LDL Non-fasting; Future  - Lipid panel reflex to direct LDL Non-fasting                 Return in about 4 weeks (around 12/22/2023) for Diabetes BG recheck.    Da Hirsch MD  [facility] ALVAREZ James is a 43 year old, presenting for the following health issues:  Blood Draw (Blood work)      HPI   Acute kidney injury.  Patient with a history of acute kidney injury after vancomycin treatment follows up for recheck of kidney function and diabetes.  Patient has not been taking diabetes medication because he understood that he should not be taking metformin.  He is asymptomatic    Diabetes Follow-up    How often are you checking your blood sugar? Two times daily  Blood sugar testing frequency justification:  Frequent hypoglycemia  What time of day are you checking your blood sugars (select all that apply)?  Before meals  Have you had any blood sugars above 200?  Yes   Have you had any blood sugars  below 70?  No  What symptoms do you notice when your blood sugar is low?  None  What concerns do you have today about your diabetes? None   Do you have any of these symptoms? (Select all that apply)  No numbness or tingling in feet.  No redness, sores or blisters on feet.  No complaints of excessive thirst.  No reports of blurry vision.  No significant changes to weight.          Hyperlipidemia Follow-Up    Are you regularly taking any medication or supplement to lower your cholesterol?   No  Are you having muscle aches or other side effects that you think could be caused by your cholesterol lowering medication?  No    Hypertension Follow-up    Do you check your blood pressure regularly outside of the clinic? No   Are you following a low salt diet? No  Are your blood pressures ever more than 140 on the top number (systolic) OR more   than 90 on the bottom number (diastolic), for example 140/90? No    BP Readings from Last 2 Encounters:   11/24/23 107/72   08/28/23 (!) 145/94     Hemoglobin A1C (%)   Date Value   08/21/2023 10.7 (H)   03/30/2021 11.6 (H)   08/18/2020 11.5 (H)     LDL Cholesterol Calculated (mg/dL)   Date Value   03/30/2021 117   08/18/2020 125 (H)     How many servings of fruits and vegetables do you eat daily?  2-3  On average, how many sweetened beverages do you drink each day (Examples: soda, juice, sweet tea, etc.  Do NOT count diet or artificially sweetened beverages)?   3  How many days per week do you exercise enough to make your heart beat faster? 3 or less  How many minutes a day do you exercise enough to make your heart beat faster? 9 or less  How many days per week do you miss taking your medication? 0        Review of Systems         Objective    /72   Pulse 76   Wt 103.2 kg (227 lb 8 oz)   SpO2 100%   BMI 28.44 kg/m    Body mass index is 28.44 kg/m .  Physical Exam  Vitals reviewed.   Constitutional:       General: He is not in acute distress.     Appearance: He is  well-developed. He is not diaphoretic.   HENT:      Head: Normocephalic.   Eyes:      General: No scleral icterus.     Conjunctiva/sclera: Conjunctivae normal.   Neck:      Thyroid: No thyromegaly.   Cardiovascular:      Rate and Rhythm: Normal rate and regular rhythm.      Heart sounds: Normal heart sounds. No murmur heard.  Pulmonary:      Effort: Pulmonary effort is normal. No respiratory distress.      Breath sounds: Normal breath sounds. No wheezing.   Musculoskeletal:      Cervical back: Normal range of motion.      Left lower leg: No edema.   Lymphadenopathy:      Cervical: No cervical adenopathy.   Skin:     General: Skin is warm and dry.   Neurological:      Mental Status: He is alert and oriented to person, place, and time.   Psychiatric:         Behavior: Behavior normal.         Thought Content: Thought content normal.         Judgment: Judgment normal.

## 2023-11-27 DIAGNOSIS — E78.5 HYPERLIPIDEMIA LDL GOAL <100: Primary | ICD-10-CM

## 2023-11-27 RX ORDER — ATORVASTATIN CALCIUM 40 MG/1
40 TABLET, FILM COATED ORAL DAILY
Qty: 90 TABLET | Refills: 3 | Status: SHIPPED | OUTPATIENT
Start: 2023-11-27 | End: 2024-10-03

## 2023-12-09 ENCOUNTER — HEALTH MAINTENANCE LETTER (OUTPATIENT)
Age: 43
End: 2023-12-09

## 2023-12-21 PROBLEM — N18.31 CHRONIC KIDNEY DISEASE, STAGE 3A (H): Status: ACTIVE | Noted: 2023-12-21

## 2023-12-22 ENCOUNTER — OFFICE VISIT (OUTPATIENT)
Dept: FAMILY MEDICINE | Facility: CLINIC | Age: 43
End: 2023-12-22
Payer: COMMERCIAL

## 2023-12-22 VITALS
WEIGHT: 231.4 LBS | HEIGHT: 75 IN | BODY MASS INDEX: 28.77 KG/M2 | HEART RATE: 86 BPM | SYSTOLIC BLOOD PRESSURE: 105 MMHG | DIASTOLIC BLOOD PRESSURE: 74 MMHG | OXYGEN SATURATION: 100 %

## 2023-12-22 DIAGNOSIS — E11.65 TYPE 2 DIABETES MELLITUS WITH HYPERGLYCEMIA, WITHOUT LONG-TERM CURRENT USE OF INSULIN (H): Primary | ICD-10-CM

## 2023-12-22 DIAGNOSIS — N18.31 CHRONIC KIDNEY DISEASE, STAGE 3A (H): ICD-10-CM

## 2023-12-22 DIAGNOSIS — N17.9 ACUTE KIDNEY INJURY (H): ICD-10-CM

## 2023-12-22 LAB
ANION GAP SERPL CALCULATED.3IONS-SCNC: 11 MMOL/L (ref 3–14)
BUN SERPL-MCNC: 20 MG/DL (ref 7–30)
CALCIUM SERPL-MCNC: 9.7 MG/DL (ref 8.5–10.1)
CHLORIDE BLD-SCNC: 109 MMOL/L (ref 94–109)
CO2 SERPL-SCNC: 25 MMOL/L (ref 20–32)
CREAT SERPL-MCNC: 1.8 MG/DL (ref 0.66–1.25)
CREAT UR-MCNC: 101 MG/DL
EGFRCR SERPLBLD CKD-EPI 2021: 47 ML/MIN/1.73M2
GLUCOSE BLD-MCNC: 153 MG/DL (ref 70–99)
HBA1C MFR BLD: 7.2 % (ref 0–5.6)
MICROALBUMIN UR-MCNC: <12 MG/L
MICROALBUMIN/CREAT UR: NORMAL MG/G{CREAT}
POTASSIUM BLD-SCNC: 4.2 MMOL/L (ref 3.4–5.3)
SODIUM SERPL-SCNC: 145 MMOL/L (ref 135–145)

## 2023-12-22 PROCEDURE — 36415 COLL VENOUS BLD VENIPUNCTURE: CPT | Performed by: FAMILY MEDICINE

## 2023-12-22 PROCEDURE — 83036 HEMOGLOBIN GLYCOSYLATED A1C: CPT | Performed by: FAMILY MEDICINE

## 2023-12-22 PROCEDURE — 80048 BASIC METABOLIC PNL TOTAL CA: CPT | Performed by: FAMILY MEDICINE

## 2023-12-22 PROCEDURE — 82043 UR ALBUMIN QUANTITATIVE: CPT | Performed by: FAMILY MEDICINE

## 2023-12-22 PROCEDURE — 82570 ASSAY OF URINE CREATININE: CPT | Performed by: FAMILY MEDICINE

## 2023-12-22 PROCEDURE — 99214 OFFICE O/P EST MOD 30 MIN: CPT | Performed by: FAMILY MEDICINE

## 2023-12-22 RX ORDER — METFORMIN HCL 500 MG
500 TABLET, EXTENDED RELEASE 24 HR ORAL DAILY
Qty: 360 TABLET | Refills: 3 | Status: SHIPPED | OUTPATIENT
Start: 2023-12-22 | End: 2023-12-22

## 2023-12-22 RX ORDER — METFORMIN HCL 500 MG
1000 TABLET, EXTENDED RELEASE 24 HR ORAL 2 TIMES DAILY WITH MEALS
Qty: 360 TABLET | Refills: 3 | Status: SHIPPED | OUTPATIENT
Start: 2023-12-22 | End: 2024-10-03

## 2023-12-22 NOTE — PROGRESS NOTES
"  Assessment & Plan     Type 2 diabetes mellitus with hyperglycemia, without long-term current use of insulin (H)  Diabetes is much better controlled than previously.  - Hemoglobin A1c; Future  - Albumin Random Urine Quantitative with Creat Ratio; Future  - Hemoglobin A1c  - Albumin Random Urine Quantitative with Creat Ratio  - canagliflozin (INVOKANA) 100 MG tablet; Take 2 tablets (200 mg) by mouth every morning (before breakfast)  - metFORMIN (GLUCOPHAGE XR) 500 MG 24 hr tablet; Take 2 tablets (1,000 mg) by mouth 2 times daily (with meals)    Acute kidney injury (H24)  Continue to monitor carefully.  - Basic Metabolic Panel ([facility]) - Results < 1hr; Future  - Basic Metabolic Panel ([facility]) - Results < 1hr        Chronic kidney disease, stage 3a (H)  Patient has a history of kidney disease stage III rachel seems to have stabilized from improving after the acute kidney injury this summer inform patient that I would like him to see a nephrologist which she was agreeable to.  The small increase in the creatinine is consistent with starting a SGLT2.  - Adult Nephrology  Referral; Future      I spent a total of 37 minutes on the day of the visit.   Time spent by me doing chart review, history and exam, documentation and further activities per the note       BMI:   Estimated body mass index is 28.92 kg/m  as calculated from the following:    Height as of this encounter: 1.905 m (6' 3\").    Weight as of this encounter: 105 kg (231 lb 6.4 oz).           Return in about 2 months (around 2/22/2024) for Diabetes BG recheck.    Da Hirsch MD  [facility] ALVAREZ James is a 43 year old, presenting for the following health issues:  Follow Up (Follow up )      12/22/2023    10:49 AM   Additional Questions   Roomed by Liza FARRAR       Diabetes Follow-up    How often are you checking your blood sugar? A few times a week  What time of day are you checking your blood sugars (select " "all that apply)?  Before meals  Have you had any blood sugars above 200?  No  Have you had any blood sugars below 70?  No  What symptoms do you notice when your blood sugar is low?  None  What concerns do you have today about your diabetes? None   Do you have any of these symptoms? (Select all that apply)  No numbness or tingling in feet.  No redness, sores or blisters on feet.  No complaints of excessive thirst.  No reports of blurry vision.  No significant changes to weight.      BP Readings from Last 2 Encounters:   12/22/23 105/74   11/24/23 107/72     Hemoglobin A1C (%)   Date Value   12/22/2023 7.2 (H)   08/21/2023 10.7 (H)   03/30/2021 11.6 (H)   08/18/2020 11.5 (H)     LDL Cholesterol Calculated (mg/dL)   Date Value   11/24/2023 145 (H)   03/30/2021 117   08/18/2020 125 (H)             Chronic Kidney Disease Follow-up  Creatinine has increased slightly since starting the canagliflozin.  The creatinine went from 1.5-1.8.  This is an expected increase and it has seemed to have a dramatic effect on glucose control.  Patient had previously follow-up with nephrology at [facility] I encouraged him to follow-up with nephrology at the [facility].    Do you take any over the counter pain medicine?: No  How many servings of fruits and vegetables do you eat daily?  2-3  On average, how many sweetened beverages do you drink each day (Examples: soda, juice, sweet tea, etc.  Do NOT count diet or artificially sweetened beverages)?   2  How many days per week do you exercise enough to make your heart beat faster? 3 or less  How many minutes a day do you exercise enough to make your heart beat faster? 10 - 19  How many days per week do you miss taking your medication? 1  What makes it hard for you to take your medications?  remembering to take        Review of Systems         Objective    /74   Pulse 86   Ht 1.905 m (6' 3\")   Wt 105 kg (231 lb 6.4 oz)   SpO2 100%   BMI 28.92 kg/m    Body mass index is 28.92 " kg/m .  Physical Exam  Vitals reviewed.   Constitutional:       General: He is not in acute distress.     Appearance: He is well-developed. He is not diaphoretic.   HENT:      Head: Normocephalic.   Eyes:      General: No scleral icterus.     Conjunctiva/sclera: Conjunctivae normal.   Neck:      Thyroid: No thyromegaly.   Cardiovascular:      Rate and Rhythm: Normal rate and regular rhythm.      Heart sounds: Normal heart sounds. No murmur heard.  Pulmonary:      Effort: Pulmonary effort is normal. No respiratory distress.      Breath sounds: Normal breath sounds. No wheezing.   Musculoskeletal:      Cervical back: Normal range of motion.      Left lower leg: No edema.   Lymphadenopathy:      Cervical: No cervical adenopathy.   Skin:     General: Skin is warm and dry.   Neurological:      Mental Status: He is alert and oriented to person, place, and time.   Psychiatric:         Behavior: Behavior normal.         Thought Content: Thought content normal.         Judgment: Judgment normal.

## 2023-12-22 NOTE — PATIENT INSTRUCTIONS
Patient Education   Here is the plan from today's visit    1. Type 2 diabetes mellitus with hyperglycemia, without long-term current use of insulin (H)    - Hemoglobin A1c; Future  - Albumin Random Urine Quantitative with Creat Ratio; Future  - Hemoglobin A1c  - Albumin Random Urine Quantitative with Creat Ratio    2. Acute kidney injury (H24)  Make the appointment with nephrology  - Basic Metabolic Panel ([facility]) - Results < 1hr; Future  - Basic Metabolic Panel ([facility]) - Results < 1hr  - canagliflozin (INVOKANA) 100 MG tablet; Take 2 tablets (200 mg) by mouth every morning (before breakfast)  Dispense: 180 tablet; Refill: 3  - metFORMIN (GLUCOPHAGE XR) 500 MG 24 hr tablet; Take 1 tablet (500 mg) by mouth daily  Dispense: 360 tablet; Refill: 3    3. Chronic kidney disease, stage 3a (H)            Please call or return to clinic if your symptoms don't go away.    Follow up plan  Return in about 2 months (around 2/22/2024) for Diabetes BG recheck.    Thank you for coming to [facility] today.  Lab Testing:  **If you had lab testing today and your results are reassuring or normal they will be mailed to you or sent through [employer] within 7 days.   **If the lab tests need quick action we will call you with the results.  **If you are having labs done on a different day, please call [phone] to schedule at [facility] or [phone] for other [facility] locations. Labs do not offer walk-in appointments.  The phone number we will call with results is # [phone] (home) . If this is not the best number please call our clinic and change the number.  Medication Refills:  If you need any refills please call your pharmacy and they will contact us.   If you need to  your refill at a new pharmacy, please contact the new pharmacy directly. The new pharmacy will help you get your medications transferred faster.   Scheduling:  If you have any concerns about today's visit or wish to  schedule another appointment please call our office during normal business hours [phone] (8-5:00 M-F). If you can no longer make a scheduled visit, please cancel via [employer] or call us to cancel.   If a referral was made to an [facility] specialty provider and you do not get a call from central scheduling, please refer to directions on your visit summary or call our office during normal business hours for assistance.   If a Mammogram was ordered for you at the Breast Center call [phone] to schedule or change your appointment.  If you had an XRay/CT/Ultrasound/MRI ordered the number is [phone] to schedule or change your radiology appointment.   [facility] has limited ultrasound appointments available on Wednesdays, if you would like your ultrasound at [facility], please call [phone] to schedule.   Medical Concerns:  If you have urgent medical concerns please call [phone] at any time of the day.    Da Hirsch MD

## 2023-12-22 NOTE — COMMUNITY RESOURCES LIST (ENGLISH)
12/22/2023   [facility]  N/A  For questions about this resource list or additional care needs, please contact your primary care clinic or care manager.  Phone: [phone]   Email: N/A   Address: [address] [place] [address]   Hours: N/A        Hotlines and Helplines       Hotline - Housing crisis  1  [facility] Distance: 8.63 miles      Phone/Virtual   [address] [place] [address]  Language: English  Hours: Mon - Sun Open 24 Hours   Phone: (831) 668-9632 Email: communications@[facility].org Website: https://[facility]-[place].org/oursaviourshousing/     2  [facility] Distance: 8.89 miles      Phone/Virtual   [address] [place] [address]  Language: English  Hours: Mon - Sun Open 24 Hours   Phone: (977) 468-3421 Email: info@[facility] Website: http://www.[facility].org          Housing       Coordinated Entry access point  3  [facility]  Office - [facility] Distance: 3.52 miles      Phone/Virtual   [address] [address]  Language: English  Hours: Mon - Fri 8:30 AM - 12:00 PM , Mon - Fri 1:00 PM - 4:00 PM  Fees: Free   Phone: (152) 876-9791 Ext.2 Email: bianca@[facility].[employer].org Website: https://www.[employer].org/usn/     4  [facility] ([facility] - [facility] Services Distance: 8.74 miles      In-Person   [address] [place] [address]  Language: English  Hours: Mon - Fri 9:00 AM - 5:00 PM  Fees: Free   Phone: (672) 850-2630 Email: housing@[facility].org Website: http://www.[facility].org/housing     Drop-in center or day shelter  5  [facility] [employer] [facility] [employer] Distance: 7.09 miles      In-Person   [address] [place] [address]  Language: English, Hmong, Cymro, Azeri  Hours: Mon - Thu 8:30 AM - 4:30 PM , Sat - Sun 9:00 AM - 12:00 PM  Fees: Free   Phone: (462) 555-5192 Email: info@[employer].org Website:  https://[facility].org/     6  [facility] [place] [facility] Distance: 8.2 miles      In-Person   [address] [place] [address]  Language: English, Yemeni, Czech  Hours: Mon - Sat 7:00 AM - 3:00 PM  Fees: Free, Self Pay   Phone: (810) 446-9801 Email: info@[employer] Website: https://www.[employer]/locations/opportunity-center/     Housing search assistance  7  [employer] [facility] Distance: 2.31 miles      Phone/Virtual   [address] [place] [address]  Language: English, Czech  Hours: Mon - Fri 9:00 AM - 5:00 PM  Fees: Free   Phone: (112) 961-7618 Email: services@[employer] Website: https://www.[employer]     8  [facility] [employer] ([facility]) [facility] Rental Housing Distance: 3.53 miles      In-Person, Phone/Virtual   [address] [place] [address]  Language: English  Hours: Mon - Fri 8:00 AM - 4:30 PM  Fees: Free   Phone: (589) 377-5039 Email: accap@[facility].org Website: http://www.accap.org     Shelter for families  9  [facility] Distance: 14.17 miles      In-Person   [address] [place] [address]  Language: English  Hours: Mon - Fri 3:00 PM - 9:00 AM , Sat - Sun Open 24 Hours  Fees: Free   Phone: (346) 780-7239 Ext.1 Website: https://www.[facility].org/2020/07/03/emergency-family-shelter/     Shelter for individuals  10  [facility] [place] - [facility] [place] Distance: 7.42 miles      In-Person   [address] [place] [address]  Language: English  Hours: Mon - Sun 5:00 PM - 10:00 AM  Fees: Free, Self Pay   Phone: (569) 562-5529 Email: info@[employer] Website: https://www.[facility].org/locations/higher-ground-shelter/     11  [facility] Distance: 7.42 miles      In-Person   [address] [place] [address]  Language: English   Hours: Mon - Fri 4:00 PM - 9:00 AM  Fees: Free   Phone: (138) 937-6223 Email: juan@[facility].[employer].com.org Website: https://[facility].org/[facility]/          Important Numbers & Websites       Emergency Services   911  [facility] Services   311  Poison Control   (626) 863-6145  Suicide Prevention Lifeline   (851) 564-7898 (TALK)  Child Abuse Hotline   (260) 646-4232 (4-A-Child)  Sexual Assault Hotline   (148) 767-5061 (HOPE)  National Runaway Safeline   (211) 715-1096 (RUNAWAY)  All-Options Talkline   (328) 872-3771  Substance Abuse Referral   (667) 618-9853 (HELP)

## 2024-01-03 ENCOUNTER — TELEPHONE (OUTPATIENT)
Dept: FAMILY MEDICINE | Facility: CLINIC | Age: 44
End: 2024-01-03
Payer: COMMERCIAL

## 2024-01-03 DIAGNOSIS — E11.65 TYPE 2 DIABETES MELLITUS WITH HYPERGLYCEMIA, WITHOUT LONG-TERM CURRENT USE OF INSULIN (H): ICD-10-CM

## 2024-01-03 NOTE — TELEPHONE ENCOUNTER
PA Initiation    Medication: INVOKANA 100 MG PO TABS  Insurance Company: Jessica - Phone [phone] Fax [phone]  Pharmacy Filling the Rx: [facility] [place], [place] - [address]  Filling Pharmacy Phone: [phone]  Filling Pharmacy Fax:    Start Date: 1/3/2024

## 2024-01-03 NOTE — TELEPHONE ENCOUNTER
Hello, this patient's insurance will not pay for the following medication without a Prior Authorization.   ?  The Patient's insurance company is: [employer]  Insurance Phone Number: [phone]  The patient's ID number is: [id number]  Drug Name: Invokana 100 mg tabs  NDC: [address]-17  Qty: 60 tabs  If needed, our pharmacy's NPI is: [id number]  Reject Reason: Quantity Dispensed Exceeds Maximum Allowed/ No more than 30 tabs per fill.     Please advise if you will pursue a prior authorization for this medication or change the order. Contact [facility] with any questions.  ?  Thanks,  ?  Zack Bland CPhT  ?  [facility]?  PH: [phone]  Fax: [phone]

## 2024-01-04 NOTE — TELEPHONE ENCOUNTER
PRIOR AUTHORIZATION DENIED    Medication: INVOKANA 100 MG PO TABS  Insurance Company: [employer] - Phone [phone] Fax [phone]  Denial Date: 1/4/2024  Denial Reason(s): Max of 1 tablet daily      Appeal Information:   Patient Notified: No

## 2024-02-05 ENCOUNTER — MYC REFILL (OUTPATIENT)
Dept: FAMILY MEDICINE | Facility: CLINIC | Age: 44
End: 2024-02-05
Payer: COMMERCIAL

## 2024-02-05 DIAGNOSIS — E11.65 TYPE 2 DIABETES MELLITUS WITH HYPERGLYCEMIA, WITHOUT LONG-TERM CURRENT USE OF INSULIN (H): ICD-10-CM

## 2024-02-12 NOTE — TELEPHONE ENCOUNTER
"Last seen 12/22/23    Request for medication refill:    canagliflozin (INVOKANA) 300 MG tablet     Providers if patient needs an appointment and you are willing to give a one month supply please refill for one month and  send a letter/MyChart using \".SMILLIMITEDREFILL\" .smillimited and route chart to \"P [facility] \" (Giving one month refill in non controlled medications is strongly recommended before denial)    If refill has been denied, meaning absolutely no refills without visit, please complete the smart phrase \".smirxrefuse\" and route it to the \"P [facility] MED REFILLS\"  pool to inform the patient and the pharmacy.    Saira Alvarado RN     "

## 2024-02-22 DIAGNOSIS — E11.65 TYPE 2 DIABETES MELLITUS WITH HYPERGLYCEMIA, WITHOUT LONG-TERM CURRENT USE OF INSULIN (H): ICD-10-CM

## 2024-02-22 DIAGNOSIS — N18.31 CHRONIC KIDNEY DISEASE, STAGE 3A (H): Primary | ICD-10-CM

## 2024-02-23 ENCOUNTER — OFFICE VISIT (OUTPATIENT)
Dept: FAMILY MEDICINE | Facility: CLINIC | Age: 44
End: 2024-02-23
Payer: COMMERCIAL

## 2024-02-23 VITALS
WEIGHT: 227.3 LBS | DIASTOLIC BLOOD PRESSURE: 76 MMHG | TEMPERATURE: 98.1 F | BODY MASS INDEX: 28.26 KG/M2 | OXYGEN SATURATION: 99 % | HEIGHT: 75 IN | RESPIRATION RATE: 18 BRPM | HEART RATE: 81 BPM | SYSTOLIC BLOOD PRESSURE: 107 MMHG

## 2024-02-23 DIAGNOSIS — J06.9 VIRAL UPPER RESPIRATORY TRACT INFECTION: ICD-10-CM

## 2024-02-23 DIAGNOSIS — I10 BENIGN ESSENTIAL HYPERTENSION: ICD-10-CM

## 2024-02-23 DIAGNOSIS — D64.9 ANEMIA, UNSPECIFIED TYPE: ICD-10-CM

## 2024-02-23 DIAGNOSIS — N18.31 CHRONIC KIDNEY DISEASE, STAGE 3A (H): ICD-10-CM

## 2024-02-23 DIAGNOSIS — E11.65 TYPE 2 DIABETES MELLITUS WITH HYPERGLYCEMIA, WITHOUT LONG-TERM CURRENT USE OF INSULIN (H): Primary | ICD-10-CM

## 2024-02-23 LAB
ANION GAP SERPL CALCULATED.3IONS-SCNC: 11 MMOL/L (ref 7–15)
BUN SERPL-MCNC: 25.4 MG/DL (ref 6–20)
CALCIUM SERPL-MCNC: 9.1 MG/DL (ref 8.6–10)
CHLORIDE SERPL-SCNC: 109 MMOL/L (ref 98–107)
CREAT SERPL-MCNC: 1.68 MG/DL (ref 0.67–1.17)
DEPRECATED HCO3 PLAS-SCNC: 22 MMOL/L (ref 22–29)
EGFRCR SERPLBLD CKD-EPI 2021: 51 ML/MIN/1.73M2
ERYTHROCYTE [DISTWIDTH] IN BLOOD BY AUTOMATED COUNT: 12.7 % (ref 10–15)
FERRITIN SERPL-MCNC: 440 NG/ML (ref 31–409)
GLUCOSE SERPL-MCNC: 162 MG/DL (ref 70–99)
HBA1C MFR BLD: 6.7 % (ref 0–5.6)
HCT VFR BLD AUTO: 32.2 % (ref 40–53)
HGB BLD-MCNC: 11 G/DL (ref 13.3–17.7)
HOLD SPECIMEN: NORMAL
IRON BINDING CAPACITY (ROCHE): 208 UG/DL (ref 240–430)
IRON SATN MFR SERPL: 37 % (ref 15–46)
IRON SERPL-MCNC: 76 UG/DL (ref 61–157)
MCH RBC QN AUTO: 28.8 PG (ref 26.5–33)
MCHC RBC AUTO-ENTMCNC: 34.2 G/DL (ref 31.5–36.5)
MCV RBC AUTO: 84 FL (ref 78–100)
PLATELET # BLD AUTO: 299 10E3/UL (ref 150–450)
POTASSIUM SERPL-SCNC: 5 MMOL/L (ref 3.4–5.3)
RBC # BLD AUTO: 3.82 10E6/UL (ref 4.4–5.9)
SODIUM SERPL-SCNC: 142 MMOL/L (ref 135–145)
WBC # BLD AUTO: 8.6 10E3/UL (ref 4–11)

## 2024-02-23 PROCEDURE — 80048 BASIC METABOLIC PNL TOTAL CA: CPT | Performed by: FAMILY MEDICINE

## 2024-02-23 PROCEDURE — 82728 ASSAY OF FERRITIN: CPT | Performed by: FAMILY MEDICINE

## 2024-02-23 PROCEDURE — 83036 HEMOGLOBIN GLYCOSYLATED A1C: CPT | Performed by: FAMILY MEDICINE

## 2024-02-23 PROCEDURE — 85027 COMPLETE CBC AUTOMATED: CPT | Performed by: FAMILY MEDICINE

## 2024-02-23 PROCEDURE — 36415 COLL VENOUS BLD VENIPUNCTURE: CPT | Performed by: FAMILY MEDICINE

## 2024-02-23 PROCEDURE — 99214 OFFICE O/P EST MOD 30 MIN: CPT | Performed by: FAMILY MEDICINE

## 2024-02-23 PROCEDURE — 83550 IRON BINDING TEST: CPT | Performed by: FAMILY MEDICINE

## 2024-02-23 PROCEDURE — 83540 ASSAY OF IRON: CPT | Performed by: FAMILY MEDICINE

## 2024-02-23 RX ORDER — GUAIFENESIN/DEXTROMETHORPHAN 100-10MG/5
5 SYRUP ORAL EVERY 4 HOURS PRN
Qty: 560 ML | Refills: 1 | Status: SHIPPED | OUTPATIENT
Start: 2024-02-23

## 2024-02-23 RX ORDER — AMLODIPINE BESYLATE 5 MG/1
5 TABLET ORAL DAILY
Qty: 90 TABLET | Refills: 1 | Status: SHIPPED | OUTPATIENT
Start: 2024-02-23

## 2024-02-23 NOTE — PROGRESS NOTES
Assessment & Plan     Type 2 diabetes mellitus with hyperglycemia, without long-term current use of insulin (H)  Diabetes is currently well-controlled on canagliflozin and metformin.  Will continue on this regimen and follow-up in 3 months.  - Hemoglobin A1c  - canagliflozin (INVOKANA) 300 MG tablet; Take 1 tablet (300 mg) by mouth every morning (before breakfast)    Anemia, unspecified type  Patient has a mild anemia on exam does not appear to be iron deficiency will continue to work this up at next visit.    Chronic kidney disease, stage 3a (H)  Patient has a chronic kidney disease encouraged him to follow-up with nephrology will continue to control his other risk factors.  - CBC with Platelets and Reflex to Iron Studies  - Iron & Iron Binding Capacity  - Ferritin  - Adult Nephrology  Referral; Future  - Basic metabolic panel; Future  - Basic metabolic panel    Benign essential hypertension  Blood pressure appears to be slightly overcontrolled with the pressures in the low 100s with mild orthostatic symptoms.  Will reduce his amlodipine and continue losartan.  - amLODIPine (NORVASC) 5 MG tablet; Take 1 tablet (5 mg) by mouth daily    Viral upper respiratory tract infection  Patient has a mild rhinitis presumably viral he declined any testing.  - guaiFENesin-dextromethorphan (ROBITUSSIN DM) 100-10 MG/5ML syrup; Take 5 mLs by mouth every 4 hours as needed for cough    Review of the result(s) of each unique test - patient has a significant number of test to review regarding diabetes at CKD and hypertension and new diagnosis of anemia.  Diagnosis or treatment significantly limited by social determinants of health - patient states English as a second language and has difficulty with medical literacy.  This so took extra time to explain things.  Ordering of each unique test    We discussed immunizations the patient at this point is reluctant though when I encouraged him to he will  "reconsider.    BMI  Estimated body mass index is 28.41 kg/m  as calculated from the following:    Height as of this encounter: 1.905 m (6' 3\").    Weight as of this encounter: 103.1 kg (227 lb 4.8 oz).             Return in about 3 months (around 5/23/2024) for Diabetes Routine Check, Hypertension.    Anabela James is a 43 year old, presenting for the following health issues:  Follow Up        2/23/2024    11:12 AM   Additional Questions   Roomed by Ralph   Accompanied by Self         2/23/2024    Information    services provided? No     HPI       Diabetes Follow-up    How often are you checking your blood sugar? Two times daily  Blood sugar testing frequency justification:  Uncontrolled diabetes  What time of day are you checking your blood sugars (select all that apply)?  After meals  Have you had any blood sugars above 200?  No  Have you had any blood sugars below 70?  No  What symptoms do you notice when your blood sugar is low?  None  What concerns do you have today about your diabetes? None   Do you have any of these symptoms? (Select all that apply)  No numbness or tingling in feet.  No redness, sores or blisters on feet.  No complaints of excessive thirst.  No reports of blurry vision.  No significant changes to weight.      BP Readings from Last 2 Encounters:   02/23/24 107/76   12/22/23 105/74     Hemoglobin A1C (%)   Date Value   02/23/2024 6.7 (H)   12/22/2023 7.2 (H)   03/30/2021 11.6 (H)   08/18/2020 11.5 (H)     LDL Cholesterol Calculated (mg/dL)   Date Value   11/24/2023 145 (H)   03/30/2021 117   08/18/2020 125 (H)             Chronic Kidney Disease Follow-up    Do you take any over the counter pain medicine?: No  How many servings of fruits and vegetables do you eat daily?  2-3  On average, how many sweetened beverages do you drink each day (Examples: soda, juice, sweet tea, etc.  Do NOT count diet or artificially sweetened beverages)?   0  How many days per week do you " "exercise enough to make your heart beat faster? 3 or less  How many minutes a day do you exercise enough to make your heart beat faster? 9 or less  How many days per week do you miss taking your medication? 0  Has not made a visit with nephrology this was reaffirmed the importance of this and patient to take on the number and will call himself.          Objective    /76   Pulse 81   Temp 98.1  F (36.7  C) (Oral)   Resp 18   Ht 1.905 m (6' 3\")   Wt 103.1 kg (227 lb 4.8 oz)   SpO2 99%   BMI 28.41 kg/m    Body mass index is 28.41 kg/m .  Physical Exam  Vitals reviewed.   Constitutional:       General: He is not in acute distress.     Appearance: He is well-developed. He is not diaphoretic.   HENT:      Head: Normocephalic.   Eyes:      General: No scleral icterus.     Conjunctiva/sclera: Conjunctivae normal.   Neck:      Thyroid: No thyromegaly.   Cardiovascular:      Rate and Rhythm: Normal rate and regular rhythm.      Heart sounds: Normal heart sounds. No murmur heard.  Pulmonary:      Effort: Pulmonary effort is normal. No respiratory distress.      Breath sounds: Normal breath sounds. No wheezing.   Musculoskeletal:      Cervical back: Normal range of motion.      Left lower leg: No edema.   Skin:     General: Skin is warm and dry.   Neurological:      Mental Status: He is alert and oriented to person, place, and time.   Psychiatric:         Behavior: Behavior normal.         Thought Content: Thought content normal.         Judgment: Judgment normal.                    Signed Electronically by: Da Hirsch MD    "

## 2024-02-24 PROBLEM — I10 BENIGN ESSENTIAL HYPERTENSION: Status: ACTIVE | Noted: 2024-02-24

## 2024-02-24 PROBLEM — D64.9 ANEMIA, UNSPECIFIED TYPE: Status: ACTIVE | Noted: 2024-02-24

## 2024-02-24 NOTE — PATIENT INSTRUCTIONS
Patient Education   Here is the plan from today's visit    1. Type 2 diabetes mellitus with hyperglycemia, without long-term current use of insulin (H)  Today her diabetes is well-controlled.  Please continue your metformin and the Invokana.  - Hemoglobin A1c  - canagliflozin (INVOKANA) 300 MG tablet; Take 1 tablet (300 mg) by mouth every morning (before breakfast)  Dispense: 90 tablet; Refill: 0    2. Anemia, unspecified type  Today when we checked your blood you had a mild anemia we will have to look into this more carefully about the causes.    3. Chronic kidney disease, stage 3a (H)  Please do call the kidney doctors to follow-up with them.  - CBC with Platelets and Reflex to Iron Studies  - Iron & Iron Binding Capacity  - Ferritin  - Adult Nephrology  Referral; Future  - Basic metabolic panel; Future  - Basic metabolic panel    4. Benign essential hypertension  I hope reducing the amlodipine will improve your symptoms and maybe encourage you to walk more frequently.  - amLODIPine (NORVASC) 5 MG tablet; Take 1 tablet (5 mg) by mouth daily  Dispense: 90 tablet; Refill: 1    5. Viral upper respiratory tract infection  I hope your symptoms improve if not please contact us.  - guaiFENesin-dextromethorphan (ROBITUSSIN DM) 100-10 MG/5ML syrup; Take 5 mLs by mouth every 4 hours as needed for cough  Dispense: 560 mL; Refill: 1          Please call or return to clinic if your symptoms don't go away.    Follow up plan  Return in about 3 months (around 5/23/2024) for Diabetes Routine Check, Hypertension.    Thank you for coming to [facility] today.  Lab Testing:  **If you had lab testing today and your results are reassuring or normal they will be mailed to you or sent through [employer] within 7 days.   **If the lab tests need quick action we will call you with the results.  **If you are having labs done on a different day, please call [phone] to schedule at [facility] or [phone] for other [facility] locations. Labs do not offer walk-in appointments.  The phone number we will call with results is # [phone] (home) . If this is not the best number please call our clinic and change the number.  Medication Refills:  If you need any refills please call your pharmacy and they will contact us.   If you need to  your refill at a new pharmacy, please contact the new pharmacy directly. The new pharmacy will help you get your medications transferred faster.   Scheduling:  If you have any concerns about today's visit or wish to schedule another appointment please call our office during normal business hours [phone] (8-5:00 M-F). If you can no longer make a scheduled visit, please cancel via [employer] or call us to cancel.   If a referral was made to an [facility] specialty provider and you do not get a call from central scheduling, please refer to directions on your visit summary or call our office during normal business hours for assistance.   If a Mammogram was ordered for you at the Breast Center call [phone] to schedule or change your appointment.  If you had an XRay/CT/Ultrasound/MRI ordered the number is [phone] to schedule or change your radiology appointment.   [facility] has limited ultrasound appointments available on Wednesdays, if you would like your ultrasound at [facility], please call [phone] to schedule.   Medical Concerns:  If you have urgent medical concerns please call [phone] at any time of the day.    Da Hirsch MD

## 2024-03-04 ENCOUNTER — TELEPHONE (OUTPATIENT)
Dept: FAMILY MEDICINE | Facility: CLINIC | Age: 44
End: 2024-03-04
Payer: COMMERCIAL

## 2024-03-05 NOTE — CONFIDENTIAL NOTE
Please call patient with results from 2/23/24    Bagel Nash message was sent on 2/24/24 and has not yet been viewed by patient:    Dear Erika  Here are your result(s). The testing shows that your kidney function is stable.  Your diabetes is well-controlled and you have a mild anemia I suggest following up with the kidney doctor and I encourage you to come and see me again in 1 to 2 months.  Please message me if you have any concerns.  Rickey Hirsch MD      -Katina Shah MD (covering for Dr. Hirsch)

## 2024-03-06 NOTE — TELEPHONE ENCOUNTER
Per lab results patient saw message from MD. Seen by patient Erika Padilla on 2/26/2024  1:33 PM  Will close encounter as patient has seen message.   Saira Alvarado RN

## 2024-05-19 NOTE — PROGRESS NOTES
Preceptor Attestation:   Patient seen, evaluated and discussed with the resident. I have verified the content of the note, which accurately reflects my assessment of the patient and the plan of care.   Supervising Physician:  Lucita Talamantes MD   dizziness

## 2024-06-19 ENCOUNTER — TRANSFERRED RECORDS (OUTPATIENT)
Dept: MULTI SPECIALTY CLINIC | Facility: CLINIC | Age: 44
End: 2024-06-19

## 2024-06-19 LAB — RETINOPATHY: NORMAL

## 2024-07-06 ENCOUNTER — HEALTH MAINTENANCE LETTER (OUTPATIENT)
Age: 44
End: 2024-07-06

## 2024-10-03 ENCOUNTER — MYC REFILL (OUTPATIENT)
Dept: FAMILY MEDICINE | Facility: CLINIC | Age: 44
End: 2024-10-03
Payer: COMMERCIAL

## 2024-10-03 DIAGNOSIS — E11.65 TYPE 2 DIABETES MELLITUS WITH HYPERGLYCEMIA, WITHOUT LONG-TERM CURRENT USE OF INSULIN (H): ICD-10-CM

## 2024-10-03 DIAGNOSIS — E78.5 HYPERLIPIDEMIA LDL GOAL <100: ICD-10-CM

## 2024-10-03 RX ORDER — METFORMIN HYDROCHLORIDE 500 MG/1
1000 TABLET, EXTENDED RELEASE ORAL 2 TIMES DAILY WITH MEALS
Qty: 360 TABLET | Refills: 3 | Status: SHIPPED | OUTPATIENT
Start: 2024-10-03

## 2024-10-03 RX ORDER — ATORVASTATIN CALCIUM 40 MG/1
40 TABLET, FILM COATED ORAL DAILY
Qty: 90 TABLET | Refills: 0 | Status: SHIPPED | OUTPATIENT
Start: 2024-10-03

## 2024-10-03 NOTE — TELEPHONE ENCOUNTER
"Request for medication refill:    metFORMIN (GLUCOPHAGE XR) 500 MG 24 hr tablet     Providers if patient needs an appointment and you are willing to give a one month supply please refill for one month and  send a letter/MyChart using \".SMILLIMITEDREFILL\" .smillimited and route chart to \"P [facility] \" (Giving one month refill in non controlled medications is strongly recommended before denial)    If refill has been denied, meaning absolutely no refills without visit, please complete the smart phrase \".smirxrefuse\" and route it to the \"[facility] MED REFILLS\"  pool to inform the patient and the pharmacy.    Nasreen Meza MA      "

## 2024-10-03 NOTE — TELEPHONE ENCOUNTER
"Request for medication refill:    atorvastatin (LIPITOR) 40 MG tablet    Providers if patient needs an appointment and you are willing to give a one month supply please refill for one month and  send a letter/MyChart using \".SMILLIMITEDREFILL\" .smillimited and route chart to \"P [facility] \" (Giving one month refill in non controlled medications is strongly recommended before denial)    If refill has been denied, meaning absolutely no refills without visit, please complete the smart phrase \".smirxrefuse\" and route it to the \"P [facility] MED REFILLS\"  pool to inform the patient and the pharmacy.    Nasreen Meza MA      "

## 2024-10-21 DIAGNOSIS — E11.65 TYPE 2 DIABETES MELLITUS WITH HYPERGLYCEMIA, WITHOUT LONG-TERM CURRENT USE OF INSULIN (H): ICD-10-CM

## 2024-10-21 RX ORDER — LOSARTAN POTASSIUM 25 MG/1
25 TABLET ORAL DAILY
Qty: 90 TABLET | Refills: 0 | Status: SHIPPED | OUTPATIENT
Start: 2024-10-21

## 2024-10-21 RX ORDER — GLIPIZIDE 10 MG/1
10 TABLET, FILM COATED, EXTENDED RELEASE ORAL DAILY
Qty: 30 TABLET | Refills: 1 | Status: SHIPPED | OUTPATIENT
Start: 2024-10-21

## 2024-10-21 NOTE — TELEPHONE ENCOUNTER
"Request for medication refill:  losartan (COZAAR) 25 MG tablet     Providers if patient needs an appointment and you are willing to give a one month supply please refill for one month and  send a letter/MyChart using \".SMILLIMITEDREFILL\" .smillimited and route chart to \"P [facility] \" (Giving one month refill in non controlled medications is strongly recommended before denial)    If refill has been denied, meaning absolutely no refills without visit, please complete the smart phrase \".smirxrefuse\" and route it to the \"P [facility] MED REFILLS\"  pool to inform the patient and the pharmacy.    Bailee Tamez, CMA      "

## 2024-10-21 NOTE — TELEPHONE ENCOUNTER
"I queued med  Pt is requesting glipiZIDE (GLUCOTROL XL) 10 MG 24 hr tablet   Discontinued on 8/28/2023      Request for medication refill:  glipiZIDE (GLUCOTROL XL) 10 MG 24 hr tablet     Providers if patient needs an appointment and you are willing to give a one month supply please refill for one month and  send a letter/MyChart using \".SMILLIMITEDREFILL\" .smillimited and route chart to \"P [facility] \" (Giving one month refill in non controlled medications is strongly recommended before denial)    If refill has been denied, meaning absolutely no refills without visit, please complete the smart phrase \".smirxrefuse\" and route it to the \"P [facility] MED REFILLS\"  pool to inform the patient and the pharmacy.    Bailee Tamez, [facility]      "

## 2024-11-23 ENCOUNTER — HEALTH MAINTENANCE LETTER (OUTPATIENT)
Age: 44
End: 2024-11-23

## 2024-11-25 DIAGNOSIS — N18.31 CHRONIC KIDNEY DISEASE, STAGE 3A (H): ICD-10-CM

## 2024-11-25 DIAGNOSIS — E11.65 TYPE 2 DIABETES MELLITUS WITH HYPERGLYCEMIA, WITHOUT LONG-TERM CURRENT USE OF INSULIN (H): ICD-10-CM

## 2025-01-21 ENCOUNTER — OFFICE VISIT (OUTPATIENT)
Dept: FAMILY MEDICINE | Facility: CLINIC | Age: 45
End: 2025-01-21
Payer: COMMERCIAL

## 2025-01-21 ENCOUNTER — TELEPHONE (OUTPATIENT)
Dept: FAMILY MEDICINE | Facility: CLINIC | Age: 45
End: 2025-01-21

## 2025-01-21 VITALS
WEIGHT: 252 LBS | RESPIRATION RATE: 14 BRPM | DIASTOLIC BLOOD PRESSURE: 75 MMHG | BODY MASS INDEX: 31.33 KG/M2 | OXYGEN SATURATION: 99 % | TEMPERATURE: 97.5 F | HEIGHT: 75 IN | HEART RATE: 72 BPM | SYSTOLIC BLOOD PRESSURE: 112 MMHG

## 2025-01-21 DIAGNOSIS — Z23 ENCOUNTER FOR IMMUNIZATION: ICD-10-CM

## 2025-01-21 DIAGNOSIS — D64.9 ANEMIA, UNSPECIFIED TYPE: ICD-10-CM

## 2025-01-21 DIAGNOSIS — N18.31 CHRONIC KIDNEY DISEASE, STAGE 3A (H): ICD-10-CM

## 2025-01-21 DIAGNOSIS — E11.65 TYPE 2 DIABETES MELLITUS WITH HYPERGLYCEMIA, WITHOUT LONG-TERM CURRENT USE OF INSULIN (H): Primary | ICD-10-CM

## 2025-01-21 DIAGNOSIS — E11.65 TYPE 2 DIABETES MELLITUS WITH HYPERGLYCEMIA, WITHOUT LONG-TERM CURRENT USE OF INSULIN (H): ICD-10-CM

## 2025-01-21 DIAGNOSIS — Z00.00 ROUTINE GENERAL MEDICAL EXAMINATION AT A HEALTH CARE FACILITY: Primary | ICD-10-CM

## 2025-01-21 DIAGNOSIS — R20.0 NUMBNESS IN FEET: ICD-10-CM

## 2025-01-21 DIAGNOSIS — I10 BENIGN ESSENTIAL HYPERTENSION: ICD-10-CM

## 2025-01-21 DIAGNOSIS — E78.5 HYPERLIPIDEMIA LDL GOAL <100: ICD-10-CM

## 2025-01-21 PROBLEM — E13.621: Status: ACTIVE | Noted: 2025-01-21

## 2025-01-21 PROBLEM — N18.5 CHRONIC KIDNEY DISEASE, STAGE 5 (H): Status: ACTIVE | Noted: 2025-01-21

## 2025-01-21 PROBLEM — N18.5 CHRONIC KIDNEY DISEASE, STAGE 5 (H): Status: RESOLVED | Noted: 2025-01-21 | Resolved: 2025-01-21

## 2025-01-21 PROBLEM — E13.621: Status: RESOLVED | Noted: 2025-01-21 | Resolved: 2025-01-21

## 2025-01-21 LAB
ANION GAP SERPL CALCULATED.3IONS-SCNC: 10 MMOL/L (ref 7–15)
BUN SERPL-MCNC: 27.1 MG/DL (ref 6–20)
CALCIUM SERPL-MCNC: 8.9 MG/DL (ref 8.8–10.4)
CHLORIDE SERPL-SCNC: 107 MMOL/L (ref 98–107)
CHOLEST SERPL-MCNC: 212 MG/DL
CREAT SERPL-MCNC: 1.77 MG/DL (ref 0.67–1.17)
CREAT UR-MCNC: 183 MG/DL
EGFRCR SERPLBLD CKD-EPI 2021: 48 ML/MIN/1.73M2
EST. AVERAGE GLUCOSE BLD GHB EST-MCNC: 169 MG/DL
FASTING STATUS PATIENT QL REPORTED: YES
FASTING STATUS PATIENT QL REPORTED: YES
FERRITIN SERPL-MCNC: 340 NG/ML (ref 31–409)
GLUCOSE SERPL-MCNC: 150 MG/DL (ref 70–99)
HBA1C MFR BLD: 7.5 % (ref 0–5.6)
HCO3 SERPL-SCNC: 24 MMOL/L (ref 22–29)
HDLC SERPL-MCNC: 36 MG/DL
HGB BLD-MCNC: 11.4 G/DL (ref 13.3–17.7)
IRON BINDING CAPACITY (ROCHE): 215 UG/DL (ref 240–430)
IRON SATN MFR SERPL: 31 % (ref 15–46)
IRON SERPL-MCNC: 66 UG/DL (ref 61–157)
LDLC SERPL CALC-MCNC: 151 MG/DL
MICROALBUMIN UR-MCNC: 44.1 MG/L
MICROALBUMIN/CREAT UR: 24.1 MG/G CR (ref 0–17)
NONHDLC SERPL-MCNC: 176 MG/DL
POTASSIUM SERPL-SCNC: 4.8 MMOL/L (ref 3.4–5.3)
PTH-INTACT SERPL-MCNC: 109 PG/ML (ref 15–65)
SODIUM SERPL-SCNC: 141 MMOL/L (ref 135–145)
TRIGL SERPL-MCNC: 127 MG/DL

## 2025-01-21 RX ORDER — ATORVASTATIN CALCIUM 40 MG/1
40 TABLET, FILM COATED ORAL DAILY
Qty: 90 TABLET | Refills: 3 | Status: SHIPPED | OUTPATIENT
Start: 2025-01-21

## 2025-01-21 RX ORDER — GLIPIZIDE 10 MG/1
10 TABLET, FILM COATED, EXTENDED RELEASE ORAL DAILY
Qty: 30 TABLET | Refills: 3 | Status: SHIPPED | OUTPATIENT
Start: 2025-01-21

## 2025-01-21 RX ORDER — AMLODIPINE BESYLATE 5 MG/1
5 TABLET ORAL DAILY
Qty: 90 TABLET | Refills: 3 | Status: SHIPPED | OUTPATIENT
Start: 2025-01-21

## 2025-01-21 RX ORDER — METFORMIN HYDROCHLORIDE EXTENDED-RELEASE TABLETS 1000 MG/1
1000 TABLET, FILM COATED, EXTENDED RELEASE ORAL 2 TIMES DAILY WITH MEALS
Qty: 180 TABLET | Refills: 3 | Status: SHIPPED | OUTPATIENT
Start: 2025-01-21

## 2025-01-21 RX ORDER — LOSARTAN POTASSIUM 25 MG/1
25 TABLET ORAL DAILY
Qty: 90 TABLET | Refills: 3 | Status: SHIPPED | OUTPATIENT
Start: 2025-01-21

## 2025-01-21 SDOH — HEALTH STABILITY: PHYSICAL HEALTH: ON AVERAGE, HOW MANY MINUTES DO YOU ENGAGE IN EXERCISE AT THIS LEVEL?: 20 MIN

## 2025-01-21 SDOH — HEALTH STABILITY: PHYSICAL HEALTH: ON AVERAGE, HOW MANY DAYS PER WEEK DO YOU ENGAGE IN MODERATE TO STRENUOUS EXERCISE (LIKE A BRISK WALK)?: 2 DAYS

## 2025-01-21 ASSESSMENT — SOCIAL DETERMINANTS OF HEALTH (SDOH): HOW OFTEN DO YOU GET TOGETHER WITH FRIENDS OR RELATIVES?: ONCE A WEEK

## 2025-01-21 NOTE — TELEPHONE ENCOUNTER
Prior Authorization Retail Medication Request    Medication/Dose: Metformin ER osmotic 1,00mg (generic Fortamet)  Diagnosis and ICD code (if different than what is on RX):    New/renewal/insurance change PA/secondary ins. PA:  Previously Tried and Failed:    Rationale:      Insurance   Primary: KARRI MARIE  Insurance ID:  [id number]    Thank you,  Itz Davis, PharmD  [facility]

## 2025-01-21 NOTE — PROGRESS NOTES
Preventive Care Visit  [facility] ALVAREZ Hirsch MD, Family Medicine  Jan 21, 2025      Assessment & Plan     Routine general medical examination at a health care facility  Up-to-date on health maintenance issues discussed working on diet and exercise to improve diabetes    Type 2 diabetes mellitus with hyperglycemia, without long-term current use of insulin (H)  Discussed use of medications and the importance for preventing diabetic complications including kidney disease heart disease and strokes patient's exam does show the patient has bilateral foot numbness.  Encourage patient to use a pillbox.  - HEMOGLOBIN A1C  - Lipid panel reflex to direct LDL Non-fasting  - metFORMIN (FORTAMET) 1000 MG 24 hr tablet; Take 1 tablet (1,000 mg) by mouth 2 times daily (with meals).  - canagliflozin (INVOKANA) 300 MG tablet; Take 1 tablet (300 mg) by mouth every morning (before breakfast).  - glipiZIDE (GLUCOTROL XL) 10 MG 24 hr tablet; Take 1 tablet (10 mg) by mouth daily. Please follow up before next refill  - losartan (COZAAR) 25 MG tablet; Take 1 tablet (25 mg) by mouth daily. Please follow up before next refill    Chronic kidney disease, stage 3a (H)  Patient has ongoing kidney disease will monitor carefully.  - Hemoglobin  - BASIC METABOLIC PANEL     - Parathyroid Hormone Intact  - Albumin Random Urine Quantitative with Creat Ratio    Benign essential hypertension  Hypertension is controlled we will continue current medications  - amLODIPine (NORVASC) 5 MG tablet; Take 1 tablet (5 mg) by mouth daily.    Hyperlipidemia LDL goal <100  Will check lipid level also  - atorvastatin (LIPITOR) 40 MG tablet; Take 1 tablet (40 mg) by mouth daily.    Encounter for immunization  Patient agreed for some immunizations today- Pneumococcal 20 Valent Conjugate (Prevnar 20)  - TDAP 10-64Y (ADACEL,BOOSTRIX)      The longitudinal plan of care for the diagnosis(es)/condition(s) as documented were addressed during this  "visit. Due to the added complexity in care, I will continue to support Erika in the subsequent management and with ongoing continuity of care.  I spent a total of 35 minutes on the day of the visit.   Time spent by me today doing chart review, history and exam, documentation and further activities per the note      BMI  Estimated body mass index is 31.5 kg/m  as calculated from the following:    Height as of this encounter: 1.905 m (6' 3\").    Weight as of this encounter: 114.3 kg (252 lb).   Weight management plan: Discussed healthy diet and exercise guidelines    Counseling  Appropriate preventive services were addressed with this patient via screening, questionnaire, or discussion as appropriate for fall prevention, nutrition, physical activity, Tobacco-use cessation, social engagement, weight loss and cognition.  Checklist reviewing preventive services available has been given to the patient.  Reviewed patient's diet, addressing concerns and/or questions.   He is at risk for lack of exercise and has been provided with information to increase physical activity for the benefit of his well-being.           Return in about 3 months (around 4/21/2025) for Annual Wellness Visit, Diabetes Routine Check.    Subjective   Erika is a 44 year old, presenting for the following:  Physical        1/21/2025    10:42 AM   Additional Questions   Roomed by Ralph         1/21/2025    Information    services provided? No        Via the Health Maintenance questionnaire, the patient has reported the following services have been completed -Eye Exam: [facility] 2024-06-19, this information has been sent to the abstraction team.    HPI  Has not been taking medications readily    Diabetes Follow-up    How often are you checking your blood sugar? A few times a month  What time of day are you checking your blood sugars (select all that apply)?  Before meals  Have you had any blood sugars above 200?  No  Have " you had any blood sugars below 70?  No  What symptoms do you notice when your blood sugar is low?  None  What concerns do you have today about your diabetes? None   Do you have any of these symptoms? (Select all that apply)  Numbness in feet          Hyperlipidemia Follow-Up    Are you regularly taking any medication or supplement to lower your cholesterol?   Yes-    Are you having muscle aches or other side effects that you think could be caused by your cholesterol lowering medication?  No    Hypertension Follow-up    Do you check your blood pressure regularly outside of the clinic? No   Are you following a low salt diet? Yes  Are your blood pressures ever more than 140 on the top number (systolic) OR more   than 90 on the bottom number (diastolic), for example 140/90? No    BP Readings from Last 2 Encounters:   01/21/25 112/75   02/23/24 107/76     Hemoglobin A1C (%)   Date Value   01/21/2025 7.5 (H)   02/23/2024 6.7 (H)   03/30/2021 11.6 (H)   08/18/2020 11.5 (H)     LDL Cholesterol Calculated (mg/dL)   Date Value   11/24/2023 145 (H)   03/30/2021 117   08/18/2020 125 (H)       Health Care Directive  Patient does not have a Health Care Directive: Discussed advance care planning with patient; information given to patient to review.      1/21/2025   General Health   How would you rate your overall physical health? Good   Feel stress (tense, anxious, or unable to sleep) Not at all         1/21/2025   Nutrition   Three or more servings of calcium each day? (!) NO   Diet: Regular (no restrictions)   How many servings of fruit and vegetables per day? (!) 0-1   How many sweetened beverages each day? (!) 2         1/21/2025   Exercise   Days per week of moderate/strenous exercise 2 days   Average minutes spent exercising at this level 20 min   (!) EXERCISE CONCERN      1/21/2025   Social Factors   Frequency of gathering with friends or relatives Once a week   Worry food won't last until get money to buy more No   Food  not last or not have enough money for food? No   Do you have housing? (Housing is defined as stable permanent housing and does not include staying ouside in a car, in a tent, in an abandoned building, in an overnight shelter, or couch-surfing.) Yes   Are you worried about losing your housing? No   Lack of transportation? No   Unable to get utilities (heat,electricity)? No         1/21/2025   Dental   Dentist two times every year? Yes         1/21/2025   TB Screening   Were you born outside of the [place]? Yes         Today's PHQ-2 Score:       1/21/2025    10:41 AM   PHQ-2 ( 1999 [employer])   Q1: Little interest or pleasure in doing things 0    Q2: Feeling down, depressed or hopeless 0    PHQ-2 Score 0    Q1: Little interest or pleasure in doing things Not at all   Q2: Feeling down, depressed or hopeless Not at all   PHQ-2 Score 0       Proxy-reported           1/21/2025   Substance Use   Alcohol more than 3/day or more than 7/wk No   Do you use any other substances recreationally? No     Social History     Tobacco Use    Smoking status: Never    Smokeless tobacco: Never   Substance Use Topics    Alcohol use: No     Alcohol/week: 0.0 standard drinks of alcohol    Drug use: No           1/21/2025   STI Screening   New sexual partner(s) since last STI/HIV test? No   ASCVD Risk   The 10-year ASCVD risk score (Marilynn MUIR, et al., 2019) is: 10.5%    Values used to calculate the score:      Age: 44 years      Sex: Male      Is Non- : Yes      Diabetic: Yes      Tobacco smoker: No      Systolic Blood Pressure: 112 mmHg      Is BP treated: Yes      HDL Cholesterol: 37 mg/dL      Total Cholesterol: 218 mg/dL        1/21/2025   Contraception/Family Planning   Questions about contraception or family planning No        Reviewed and updated as needed this visit by Provider   Tobacco  Allergies  Meds  Problems  Med Hx  Surg Hx  Fam Hx                     Objective    Exam  /75   Pulse 72    "Temp 97.5  F (36.4  C) (Temporal)   Resp 14   Ht 1.905 m (6' 3\")   Wt 114.3 kg (252 lb)   SpO2 99%   BMI 31.50 kg/m     Estimated body mass index is 31.5 kg/m  as calculated from the following:    Height as of this encounter: 1.905 m (6' 3\").    Weight as of this encounter: 114.3 kg (252 lb).    Physical Exam  Vitals reviewed.   Constitutional:       General: He is not in acute distress.     Appearance: He is well-developed. He is not diaphoretic.   HENT:      Head: Normocephalic.   Eyes:      General: No scleral icterus.     Conjunctiva/sclera: Conjunctivae normal.   Neck:      Thyroid: No thyromegaly.   Cardiovascular:      Rate and Rhythm: Normal rate and regular rhythm.      Pulses:           Dorsalis pedis pulses are 1+ on the right side and 1+ on the left side.        Posterior tibial pulses are 1+ on the right side and 2+ on the left side.      Heart sounds: Normal heart sounds. No murmur heard.  Pulmonary:      Effort: Pulmonary effort is normal. No respiratory distress.      Breath sounds: Normal breath sounds. No wheezing.   Musculoskeletal:      Cervical back: Normal range of motion.      Left lower leg: No edema.   Feet:      Right foot:      Protective Sensation: 10 sites tested.  0 sites sensed.      Skin integrity: Skin integrity normal.      Left foot:      Protective Sensation:   0 sites sensed.      Skin integrity: Skin integrity normal.      Comments: Complete anesthesia on the plantar aspect of both feet  Skin:     General: Skin is warm and dry.   Neurological:      Mental Status: He is alert and oriented to person, place, and time. Mental status is at baseline.   Psychiatric:         Behavior: Behavior normal.         Thought Content: Thought content normal.         Judgment: Judgment normal.               Signed Electronically by: Da Hirsch MD    "

## 2025-01-21 NOTE — PATIENT INSTRUCTIONS
.Patient Education   Here is the plan from today's visit    1. Type 2 diabetes mellitus with hyperglycemia, without long-term current use of insulin (H)  Please use a pill box to organize your medications and take them daily  - HEMOGLOBIN A1C  - Lipid panel reflex to direct LDL Non-fasting  - metFORMIN (FORTAMET) 1000 MG 24 hr tablet; Take 1 tablet (1,000 mg) by mouth 2 times daily (with meals).  Dispense: 180 tablet; Refill: 3  - canagliflozin (INVOKANA) 300 MG tablet; Take 1 tablet (300 mg) by mouth every morning (before breakfast).  Dispense: 90 tablet; Refill: 3  - glipiZIDE (GLUCOTROL XL) 10 MG 24 hr tablet; Take 1 tablet (10 mg) by mouth daily. Please follow up before next refill  Dispense: 30 tablet; Refill: 3  - losartan (COZAAR) 25 MG tablet; Take 1 tablet (25 mg) by mouth daily. Please follow up before next refill  Dispense: 90 tablet; Refill: 3    2. Chronic kidney disease, stage 3a (H)  - Hemoglobin  - BASIC METABOLIC PANEL  - Albumin Random Urine Quantitative with Creat Ratio      3 Benign essential hypertension  - amLODIPine (NORVASC) 5 MG tablet; Take 1 tablet (5 mg) by mouth daily.  Dispense: 90 tablet; Refill: 3    6. Hyperlipidemia LDL goal <100  - atorvastatin (LIPITOR) 40 MG tablet; Take 1 tablet (40 mg) by mouth daily.  Dispense: 90 tablet; Refill: 3    7. Routine general medical examination at a health care facility    8. Encounter for immunization    - Pneumococcal 20 Valent Conjugate (Prevnar 20)  - TDAP 10-64Y (ADACEL,BOOSTRIX)          Please call or return to clinic if your symptoms don't go away.    Follow up plan  Return in about 3 months (around 4/21/2025) for Annual Wellness Visit, Diabetes Routine Check.    Thank you for coming to [facility] today.  Lab Testing:  **If you had lab testing today and your results are reassuring or normal they will be mailed to you or sent through [employer] within 7 days.   **If the lab tests need quick action we will call you with the  results.  **If you are having labs done on a different day, please call [phone] to schedule at [facility] or [phone] for other [facility] locations. Labs do not offer walk-in appointments.  The phone number we will call with results is # [phone] (home) . If this is not the best number please call our clinic and change the number.  Medication Refills:  If you need any refills please call your pharmacy and they will contact us.   If you need to  your refill at a new pharmacy, please contact the new pharmacy directly. The new pharmacy will help you get your medications transferred faster.   Scheduling:  If you have any concerns about today's visit or wish to schedule another appointment please call our office during normal business hours [phone] (8-5:00 M-F). If you can no longer make a scheduled visit, please cancel via [employer] or call us to cancel.   If a referral was made to an [facility] specialty provider and you do not get a call from central scheduling, please refer to directions on your visit summary or call our office during normal business hours for assistance.   If a Mammogram was ordered for you at the [facility] call [phone] to schedule or change your appointment.  If you had an XRay/CT/Ultrasound/MRI ordered the number is [phone] to schedule or change your radiology appointment.   [facility] has limited ultrasound appointments available on Wednesdays, if you would like your ultrasound at [facility], please call [phone] to schedule.   Medical Concerns:  If you have urgent medical concerns please call [phone] at any time of the day.    Da Hirsch MD    Patient Education   Preventive Care Advice   This is general advice given by our system to help you stay healthy. However, your care team may have specific advice just for you. Please talk to your care team about your preventive care needs.  Nutrition  Eat 5 or more  servings of fruits and vegetables each day.  Try wheat bread, brown rice and whole grain pasta (instead of white bread, rice, and pasta).  Get enough calcium and vitamin D. Check the label on foods and aim for 100% of the RDA (recommended daily allowance).  Lifestyle  Exercise at least 150 minutes each week  (30 minutes a day, 5 days a week).  Do muscle strengthening activities 2 days a week. These help control your weight and prevent disease.  No smoking.  Wear sunscreen to prevent skin cancer.  Have a dental exam and cleaning every 6 months.  Yearly exams  See your health care team every year to talk about:  Any changes in your health.  Any medicines your care team has prescribed.  Preventive care, family planning, and ways to prevent chronic diseases.  Shots (vaccines)   HPV shots (up to age 26), if you've never had them before.  Hepatitis B shots (up to age 59), if you've never had them before.  COVID-19 shot: Get this shot when it's due.  Flu shot: Get a flu shot every year.  Tetanus shot: Get a tetanus shot every 10 years.  Pneumococcal, hepatitis A, and RSV shots: Ask your care team if you need these based on your risk.  Shingles shot (for age 50 and up)  General health tests  Diabetes screening:  Starting at age 35, Get screened for diabetes at least every 3 years.  If you are younger than age 35, ask your care team if you should be screened for diabetes.  Cholesterol test: At age 39, start having a cholesterol test every 5 years, or more often if advised.  Bone density scan (DEXA): At age 50, ask your care team if you should have this scan for osteoporosis (brittle bones).  Hepatitis C: Get tested at least once in your life.  STIs (sexually transmitted infections)  Before age 24: Ask your care team if you should be screened for STIs.  After age 24: Get screened for STIs if you're at risk. You are at risk for STIs (including HIV) if:  You are sexually active with more than one person.  You don't use condoms  every time.  You or a partner was diagnosed with a sexually transmitted infection.  If you are at risk for HIV, ask about PrEP medicine to prevent HIV.  Get tested for HIV at least once in your life, whether you are at risk for HIV or not.  Cancer screening tests  Cervical cancer screening: If you have a cervix, begin getting regular cervical cancer screening tests starting at age 21.  Breast cancer scan (mammogram): If you've ever had breasts, begin having regular mammograms starting at age 40. This is a scan to check for breast cancer.  Colon cancer screening: It is important to start screening for colon cancer at age 45.  Have a colonoscopy test every 10 years (or more often if you're at risk) Or, ask your provider about stool tests like a FIT test every year or Cologuard test every 3 years.  To learn more about your testing options, visit:   .  For help making a decision, visit:   https://bit.ly/qg60920.  Prostate cancer screening test: If you have a prostate, ask your care team if a prostate cancer screening test (PSA) at age 55 is right for you.  Lung cancer screening: If you are a current or former smoker ages 50 to 80, ask your care team if ongoing lung cancer screenings are right for you.  For informational purposes only. Not to replace the advice of your health care provider. Copyright   2023 [employer]. All rights reserved. Clinically reviewed by the [employer] Program. [employer]_ 199739 - REV 01/24.

## 2025-01-24 NOTE — TELEPHONE ENCOUNTER
PRIOR AUTHORIZATION DENIED    Medication: FORTAMET OSMOTIC METFORMIN HCL ER 1000 MG PO TB24  Insurance Company: [employer] - Phone [phone] Fax [phone]  Denial Date: 1/24/2025  Denial Reason(s): Excluded from plan   Appeal Information: NA  Patient Notified: NO

## 2025-05-11 ENCOUNTER — HEALTH MAINTENANCE LETTER (OUTPATIENT)
Age: 45
End: 2025-05-11

## 2025-06-12 ENCOUNTER — OFFICE VISIT (OUTPATIENT)
Dept: FAMILY MEDICINE | Facility: CLINIC | Age: 45
End: 2025-06-12
Payer: COMMERCIAL

## 2025-06-12 VITALS
TEMPERATURE: 97.8 F | HEIGHT: 75 IN | SYSTOLIC BLOOD PRESSURE: 136 MMHG | RESPIRATION RATE: 17 BRPM | HEART RATE: 74 BPM | DIASTOLIC BLOOD PRESSURE: 89 MMHG | WEIGHT: 262.9 LBS | OXYGEN SATURATION: 100 % | BODY MASS INDEX: 32.69 KG/M2

## 2025-06-12 DIAGNOSIS — L97.421 DIABETIC ULCER OF LEFT MIDFOOT ASSOCIATED WITH TYPE 2 DIABETES MELLITUS, LIMITED TO BREAKDOWN OF SKIN (H): ICD-10-CM

## 2025-06-12 DIAGNOSIS — E11.621 DIABETIC ULCER OF LEFT MIDFOOT ASSOCIATED WITH TYPE 2 DIABETES MELLITUS, LIMITED TO BREAKDOWN OF SKIN (H): ICD-10-CM

## 2025-06-12 DIAGNOSIS — I10 BENIGN ESSENTIAL HYPERTENSION: ICD-10-CM

## 2025-06-12 DIAGNOSIS — E11.65 TYPE 2 DIABETES MELLITUS WITH HYPERGLYCEMIA, WITHOUT LONG-TERM CURRENT USE OF INSULIN (H): Primary | ICD-10-CM

## 2025-06-12 DIAGNOSIS — L97.521 ULCER OF LEFT FOOT, LIMITED TO BREAKDOWN OF SKIN (H): ICD-10-CM

## 2025-06-12 DIAGNOSIS — G63 POLYNEUROPATHY ASSOCIATED WITH UNDERLYING DISEASE: ICD-10-CM

## 2025-06-12 DIAGNOSIS — R60.0 PERIPHERAL EDEMA: ICD-10-CM

## 2025-06-12 DIAGNOSIS — E78.5 HYPERLIPIDEMIA LDL GOAL <100: ICD-10-CM

## 2025-06-12 DIAGNOSIS — Z11.3 SCREENING FOR STDS (SEXUALLY TRANSMITTED DISEASES): ICD-10-CM

## 2025-06-12 LAB
ANION GAP SERPL CALCULATED.3IONS-SCNC: 11 MMOL/L (ref 7–15)
BUN SERPL-MCNC: 30.9 MG/DL (ref 6–20)
CALCIUM SERPL-MCNC: 9.5 MG/DL (ref 8.8–10.4)
CHLORIDE SERPL-SCNC: 104 MMOL/L (ref 98–107)
CREAT SERPL-MCNC: 1.74 MG/DL (ref 0.67–1.17)
EGFRCR SERPLBLD CKD-EPI 2021: 49 ML/MIN/1.73M2
EST. AVERAGE GLUCOSE BLD GHB EST-MCNC: 209 MG/DL
GLUCOSE SERPL-MCNC: 252 MG/DL (ref 70–99)
HBA1C MFR BLD: 8.9 % (ref 0–5.6)
HCO3 SERPL-SCNC: 23 MMOL/L (ref 22–29)
HIV 1+2 AB+HIV1 P24 AG SERPL QL IA: NONREACTIVE
NT-PROBNP SERPL-MCNC: <36 PG/ML (ref 0–138)
POTASSIUM SERPL-SCNC: 5.1 MMOL/L (ref 3.4–5.3)
SODIUM SERPL-SCNC: 138 MMOL/L (ref 135–145)
T PALLIDUM AB SER QL: NONREACTIVE

## 2025-06-12 RX ORDER — ACETAMINOPHEN 500 MG
1000 TABLET ORAL EVERY 6 HOURS PRN
COMMUNITY
Start: 2023-08-18

## 2025-06-12 RX ORDER — AMLODIPINE BESYLATE 5 MG/1
5 TABLET ORAL DAILY
Qty: 90 TABLET | Refills: 3 | Status: SHIPPED | OUTPATIENT
Start: 2025-06-12

## 2025-06-12 RX ORDER — GLIPIZIDE 10 MG/1
10 TABLET, FILM COATED, EXTENDED RELEASE ORAL DAILY
Qty: 30 TABLET | Refills: 3 | Status: SHIPPED | OUTPATIENT
Start: 2025-06-12

## 2025-06-12 RX ORDER — LOSARTAN POTASSIUM 25 MG/1
25 TABLET ORAL DAILY
Qty: 90 TABLET | Refills: 3 | Status: SHIPPED | OUTPATIENT
Start: 2025-06-12

## 2025-06-12 RX ORDER — ATORVASTATIN CALCIUM 40 MG/1
40 TABLET, FILM COATED ORAL DAILY
Qty: 90 TABLET | Refills: 3 | Status: SHIPPED | OUTPATIENT
Start: 2025-06-12

## 2025-06-12 ASSESSMENT — PAIN SCALES - GENERAL: PAINLEVEL_OUTOF10: NO PAIN (0)

## 2025-06-12 NOTE — PROGRESS NOTES
Assessment & Plan     Type 2 diabetes mellitus with hyperglycemia, without long-term current use of insulin (H)  Diabetic ulcer of left midfoot associated with type 2 diabetes mellitus, limited to breakdown of skin (H)  Ulcer of left foot, limited to breakdown of skin (H)  Most recent A1c 7.5 Jan 2025. Current medication regimen includes metformin 1000 mg BID, glipizide 10 mg daily, invokana 300 mg daily. Patient not checking blood sugars consistently at home. Due for repeat eye exam and recheck A1c. Also has significant peripheral neuropathy with associated diabetic food ulcer which appears to be healing well. Plan: recheck labs, refilled medications, diabetic shoes and wound care dme orders, ophthalmology referral, follow up with PCP in 4 weeks. Consider MTM referral as patient does not seem confident in which medications he is taking and why.  - HEMOGLOBIN A1C  - Adult Eye  Referral  - Orthotics, Mastectomy and Custom Compression Orders Type: Orthotic; Orthotic Type Requested: Diabetic Shoe(s)/Insert(s); X-Depth Shoe(s): Yes; Insert(s) Quantity: 3 pair; Req Documentation: Progress note must support the need for shoes/orthotics. ...  - metFORMIN (GLUCOPHAGE) 1000 MG tablet  Dispense: 180 tablet; Refill: 1  - glipiZIDE (GLUCOTROL XL) 10 MG 24 hr tablet  Dispense: 30 tablet; Refill: 3  - canagliflozin (INVOKANA) 300 MG tablet  Dispense: 90 tablet; Refill: 3  - Wound Care Order    Screening for STDs (sexually transmitted diseases)  Patient with benign appearing penile lesion most consistent with dermatofibroma. Recommending conservative cares with daily hygiene and warm cloths. Low concern for STI (monogamous using condoms) but reasonable to obtain labs for rule out. Will follow up as indicated.  - Neisseria gonorrhoeae PCR  - Chlamydia trachomatis PCR  - Treponema Abs w Reflex to RPR and Titer  - HIV Antigen Antibody Combo    Benign essential hypertension  Chronic, stable condition, well controlled on  "current medication regimen. Refills completed today.   - Home Blood Pressure Monitor Order  - amLODIPine (NORVASC) 5 MG tablet  Dispense: 90 tablet; Refill: 3  - losartan (COZAAR) 25 MG tablet  Dispense: 90 tablet; Refill: 3    Hyperlipidemia LDL goal <100  Chronic, poorly controlled. Refills completed today. Should have recheck lipid panel and possibly increase statin based on Jan 2025 lipid panel results. Should have dedicated visit with PCP for further discussion.  - atorvastatin (LIPITOR) 40 MG tablet  Dispense: 90 tablet; Refill: 3    Peripheral edema  Patient with 2+ LE edema. May be side effect of amlodipine. Will obtain BNP today to rule out HF. If elevated, would recommend Echo.  -BNP      The longitudinal plan of care for the diagnosis(es)/condition(s) as documented were addressed during this visit. Due to the added complexity in care, I will continue to support Erika in the subsequent management and with ongoing continuity of care.      Follow-up  Return in about 4 weeks (around 7/10/2025) for with Torrey or me for diabetes follow up.    Subjective   Erika is a 45 year old, presenting for the following health issues:  Recheck Medication and RECHECK        6/12/2025     8:13 AM   Additional Questions   Roomed by Anthony   Accompanied by Self         6/12/2025    Information    services provided? No     HPI    Patient here to discuss the following:    Penile lesion:  - 1 week  - Ball-like lesion at base  - Gradual  - Not painful, no draingage  - Condoms  - 1 female partner    Foot ulcer:  - Wants referral  - Ulcer previously removed  - Never healed fully  - L foot  - No pain, mild drainage    Med refills:  - Needs refills for \"all of them\"  - Blood sugars at home 128  - Just checks when feeling low, mostly at night, 150s usual   - No fasting  - No BP cuff at home          Objective    /89   Pulse 74   Temp 97.8  F (36.6  C) (Temporal)   Resp 17   Ht 1.905 m (6' 3\")   " Wt 119.3 kg (262 lb 14.4 oz)   SpO2 100%   BMI 32.86 kg/m    Body mass index is 32.86 kg/m .  Physical Exam  Vitals reviewed.   Constitutional:       General: He is not in acute distress.     Appearance: Normal appearance.   HENT:      Head: Normocephalic and atraumatic.   Pulmonary:      Effort: Pulmonary effort is normal. No respiratory distress.   Musculoskeletal:      Right lower le+ Edema present.      Left lower le+ Edema present.   Skin:     General: Skin is warm and dry.      Comments: 0.5 cm singular circular painless papule to right penile base. No erythema, warmth, swelling, or drainage.     0.5 cm open ulcer to lateral plantar surface of left midfoot. Good blood supply. No drainage or purulence. No erythema or swelling. Appears to be healing well.     Neurological:      Mental Status: He is alert. Mental status is at baseline.   Psychiatric:         Mood and Affect: Mood normal.         Behavior: Behavior normal.         Thought Content: Thought content normal.         Judgment: Judgment normal.                Signed Electronically by: Vik Salguero DO  DME (Durable Medical Equipment) Orders and Documentation  Orders Placed This Encounter   Procedures    Home Blood Pressure Monitor Order    Orthotics, Mastectomy and Custom Compression Orders Type: Orthotic; Orthotic Type Requested: Diabetic Shoe(s)/Insert(s); X-Depth Shoe(s): Yes; Insert(s) Quantity: 3 pair; Req Documentation: Progress note must support the need for shoes/orthotics. ...    Compression Sleeve/Stocking Order    Wound Care Order      The patient was assessed and it was determined the patient is in need of the following listed DME Supplies/Equipment. Please complete supporting documentation below to demonstrate medical necessity.       DME (Durable Medical Equipment) Orders and Documentation  Orders Placed This Encounter   Procedures    Home Blood Pressure Monitor Order    Orthotics, Mastectomy and Custom Compression Orders  Type: Orthotic; Orthotic Type Requested: Diabetic Shoe(s)/Insert(s); X-Depth Shoe(s): Yes; Insert(s) Quantity: 3 pair; Req Documentation: Progress note must support the need for shoes/orthotics. ...    Compression Sleeve/Stocking Order    Wound Care Order      The patient was assessed and it was determined the patient is in need of the following listed DME Supplies/Equipment. Please complete supporting documentation below to demonstrate medical necessity.      DME All Other Item(s) Documentation    List reason for need and supporting documentation for medical necessity below for each DME item.     1. See A/P above

## 2025-06-12 NOTE — PROGRESS NOTES
Preceptor Attestation:   Patient seen, evaluated and discussed with the resident. I have verified the content of the note, which accurately reflects my assessment of the patient and the plan of care.   Supervising Physician:  Da Hirsch MD

## 2025-06-16 ENCOUNTER — PATIENT OUTREACH (OUTPATIENT)
Dept: CARE COORDINATION | Facility: CLINIC | Age: 45
End: 2025-06-16
Payer: COMMERCIAL

## 2025-07-25 ENCOUNTER — OFFICE VISIT (OUTPATIENT)
Dept: PHARMACY | Facility: CLINIC | Age: 45
End: 2025-07-25
Payer: COMMERCIAL

## 2025-07-25 DIAGNOSIS — I10 BENIGN ESSENTIAL HYPERTENSION: ICD-10-CM

## 2025-07-25 DIAGNOSIS — E78.5 HYPERLIPIDEMIA LDL GOAL <100: ICD-10-CM

## 2025-07-25 DIAGNOSIS — E11.65 TYPE 2 DIABETES MELLITUS WITH HYPERGLYCEMIA, WITHOUT LONG-TERM CURRENT USE OF INSULIN (H): Primary | ICD-10-CM

## 2025-07-25 PROBLEM — Z89.422 S/P AMPUTATION OF LESSER TOE, LEFT: Status: ACTIVE | Noted: 2025-07-25

## 2025-07-25 PROCEDURE — 99607 MTMS BY PHARM ADDL 15 MIN: CPT | Performed by: PHARMACIST

## 2025-07-25 PROCEDURE — 99605 MTMS BY PHARM NP 15 MIN: CPT | Performed by: PHARMACIST

## 2025-07-25 RX ORDER — ATORVASTATIN CALCIUM 40 MG/1
40 TABLET, FILM COATED ORAL DAILY
Qty: 90 TABLET | Refills: 3 | Status: SHIPPED | OUTPATIENT
Start: 2025-07-25

## 2025-07-25 RX ORDER — KETOROLAC TROMETHAMINE 30 MG/ML
1 INJECTION, SOLUTION INTRAMUSCULAR; INTRAVENOUS DAILY
Qty: 1 EACH | Refills: 0 | Status: SHIPPED | OUTPATIENT
Start: 2025-07-25

## 2025-07-25 RX ORDER — LOSARTAN POTASSIUM 50 MG/1
50 TABLET ORAL DAILY
Qty: 90 TABLET | Refills: 1 | Status: SHIPPED | OUTPATIENT
Start: 2025-07-25

## 2025-07-25 NOTE — PROGRESS NOTES
Medication Therapy Management (MTM) Encounter    ASSESSMENT:                            Medication Adherence/Access: No issues identified.    Type 2 diabetes mellitus with hyperglycemia, without long-term current use of insulin (H)  Uncontrolled  A1c above 7% and recent toe amputation.  Sglt2 has been stopped and not taken for the past month.  Today we discussed adherence of the metformin. I have offered changing the dosing to 2000mg of the ER once a day, but pt is not interested in making this change today.  Will resume using SGLT2 but elect a different drug.  Canagliflozin has an association with increased risk of amputation and lacks some of the CV benefits with other agents, so a change to empagliflozin is a reasonable change.  Plan to start 10 mg for 4 weeks and then increase.    Recommend to begin monitoring BS through [facility].  Rx for all DM testing supplies have been sent.        Hyperlipidemia LDL goal <100  Continue on current therapy   - atorvastatin (LIPITOR) 40 MG tablet  Dispense: 90 tablet; Refill: 3    Benign essential hypertension  Uncontrolled  Blood pressure >130/80  Recommend to increase losartan to 50 mg daily         PLAN:                            Losartan 50 mg a day   Will receive medication from [facility] pharmacy in blister packs.      - Continuous Glucose  (FREESTYLE ANEL 3 READER) DANIKA  Dispense: 1 each; Refill: 0  - Continuous Glucose Sensor (FREESTYLE ANEL 2 SENSOR) MISC  Dispense: 2 each; Refill: 5  - blood glucose monitoring (NO BRAND SPECIFIED) meter device kit  Dispense: 1 kit; Refill: 0  - blood glucose (NO BRAND SPECIFIED) test strip  Dispense: 100 strip; Refill: 11  - blood glucose (NO BRAND SPECIFIED) lancets standard  Dispense: 100 Lancet; Refill: 11  - empagliflozin (JARDIANCE) 10 MG TABS tablet  Dispense: 90 tablet; Refill: 1  - metFORMIN (GLUCOPHAGE) 1000 MG tablet  Dispense: 180 tablet; Refill: 1    Follow-up: 1 month     SUBJECTIVE/OBJECTIVE:                         "  Erika Padilla is a 45 year old male seen for an initial visit. He was referred to me from Dr. Duffy. Patient is seeing provider after our visit today.     Reason for visit: Medication review.    Allergies/ADRs: Reviewed in chart  Past Medical History: Reviewed in chart  Tobacco: He reports that he has never smoked. He has never been exposed to tobacco smoke. He has never used smokeless tobacco.  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  Current diabetes symptoms: none  Diet/Exercise: Decrease mobility after surgery      Blood sugar monitoring: never  Eye exam in the last 12 months? No  Foot exam is up to date    Hypertension   Losartan 25 mg daily     Patient reports no current medication side effects  Patient does not self-monitor blood pressure.       Hyperlipidemia     atorvastatin 40mg daily - has not been consistently taking this   Patient reports no significant myalgias or other side effects.           Today's Vitals:   BP Readings from Last 1 Encounters:   07/25/25 (!) 143/88     Pulse Readings from Last 1 Encounters:   07/25/25 98     Wt Readings from Last 1 Encounters:   06/12/25 262 lb 14.4 oz (119.3 kg)     Ht Readings from Last 1 Encounters:   07/25/25 6' 3\" (1.905 m)     Estimated body mass index is 32.86 kg/m  as calculated from the following:    Height as of an earlier encounter on 7/25/25: 6' 3\" (1.905 m).    Weight as of 6/12/25: 262 lb 14.4 oz (119.3 kg).    Temp Readings from Last 1 Encounters:   07/25/25 97.8  F (36.6  C) (Temporal)     ----------------  Post Discharge Medication Reconciliation Status: discharge medications reconciled and changed, per note/orders.    I spent 30 minutes with this patient today (an extra 15 minutes was spent creating the Medication Action Plan). All changes were made via " collaborative practice agreement with Dr. Duffy.   Discussed plan with Dr. Duffy today in clinic    A summary of these recommendations was given to the patient (see AVS from today's appointment with Dr. Duffy).    David Malin, Pharm.D.         Medication Therapy Recommendations  Benign essential hypertension   1 Current Medication: losartan (COZAAR) 25 MG tablet (Discontinued)   Current Medication Sig: Take 1 tablet (25 mg) by mouth daily. Please follow up before next refill   Rationale: Dose too low - Dosage too low - Effectiveness   Recommendation: Increase Dose - losartan 50 MG tablet   Status: Accepted per Provider   Identified Date: 7/25/2025 Completed Date: 7/25/2025         Type 2 diabetes mellitus with hyperglycemia, without long-term current use of insulin (H)   1 Current Medication: canagliflozin (INVOKANA) 300 MG tablet (Discontinued)   Current Medication Sig: Take 1 tablet (300 mg) by mouth every morning (before breakfast).   Rationale: More effective medication available - Ineffective medication - Effectiveness   Recommendation: Change Medication - Jardiance 10 MG Tabs   Status: Accepted per Provider   Identified Date: 7/25/2025 Completed Date: 7/25/2025

## 2025-08-20 ENCOUNTER — TELEPHONE (OUTPATIENT)
Dept: FAMILY MEDICINE | Facility: CLINIC | Age: 45
End: 2025-08-20
Payer: COMMERCIAL